# Patient Record
Sex: MALE | Race: WHITE | NOT HISPANIC OR LATINO
[De-identification: names, ages, dates, MRNs, and addresses within clinical notes are randomized per-mention and may not be internally consistent; named-entity substitution may affect disease eponyms.]

---

## 2017-02-20 ENCOUNTER — APPOINTMENT (OUTPATIENT)
Dept: FAMILY MEDICINE | Facility: CLINIC | Age: 52
End: 2017-02-20

## 2017-02-20 ENCOUNTER — NON-APPOINTMENT (OUTPATIENT)
Age: 52
End: 2017-02-20

## 2017-02-20 VITALS — SYSTOLIC BLOOD PRESSURE: 134 MMHG | DIASTOLIC BLOOD PRESSURE: 80 MMHG

## 2017-02-20 RX ORDER — KETOROLAC TROMETHAMINE 30 MG/ML
30 INJECTION, SOLUTION INTRAMUSCULAR; INTRAVENOUS
Qty: 1 | Refills: 0 | Status: COMPLETED | OUTPATIENT
Start: 2017-02-20

## 2017-02-20 RX ADMIN — KETOROLAC TROMETHAMINE 0 MG/ML: 30 INJECTION, SOLUTION INTRAMUSCULAR; INTRAVENOUS at 00:00

## 2017-10-12 ENCOUNTER — APPOINTMENT (OUTPATIENT)
Dept: FAMILY MEDICINE | Facility: CLINIC | Age: 52
End: 2017-10-12
Payer: MEDICAID

## 2017-10-12 VITALS
TEMPERATURE: 98.1 F | BODY MASS INDEX: 28.34 KG/M2 | HEART RATE: 85 BPM | DIASTOLIC BLOOD PRESSURE: 80 MMHG | OXYGEN SATURATION: 97 % | SYSTOLIC BLOOD PRESSURE: 130 MMHG | RESPIRATION RATE: 14 BRPM | HEIGHT: 68 IN | WEIGHT: 187 LBS

## 2017-10-12 PROCEDURE — 93000 ELECTROCARDIOGRAM COMPLETE: CPT

## 2017-10-12 PROCEDURE — 99396 PREV VISIT EST AGE 40-64: CPT | Mod: 25

## 2017-11-16 ENCOUNTER — APPOINTMENT (OUTPATIENT)
Dept: FAMILY MEDICINE | Facility: CLINIC | Age: 52
End: 2017-11-16
Payer: MEDICAID

## 2017-11-16 VITALS — SYSTOLIC BLOOD PRESSURE: 142 MMHG | DIASTOLIC BLOOD PRESSURE: 88 MMHG

## 2017-11-16 PROCEDURE — 99213 OFFICE O/P EST LOW 20 MIN: CPT

## 2017-12-12 ENCOUNTER — APPOINTMENT (OUTPATIENT)
Dept: FAMILY MEDICINE | Facility: CLINIC | Age: 52
End: 2017-12-12

## 2018-01-22 ENCOUNTER — APPOINTMENT (OUTPATIENT)
Dept: FAMILY MEDICINE | Facility: CLINIC | Age: 53
End: 2018-01-22

## 2018-02-06 ENCOUNTER — APPOINTMENT (OUTPATIENT)
Dept: FAMILY MEDICINE | Facility: CLINIC | Age: 53
End: 2018-02-06
Payer: MEDICAID

## 2018-02-06 VITALS — TEMPERATURE: 98.6 F | DIASTOLIC BLOOD PRESSURE: 84 MMHG | SYSTOLIC BLOOD PRESSURE: 120 MMHG

## 2018-02-06 PROCEDURE — 90688 IIV4 VACCINE SPLT 0.5 ML IM: CPT

## 2018-02-06 PROCEDURE — G0008: CPT

## 2018-02-06 PROCEDURE — 99213 OFFICE O/P EST LOW 20 MIN: CPT | Mod: 25

## 2018-02-06 RX ORDER — AZITHROMYCIN 250 MG/1
250 TABLET, FILM COATED ORAL
Qty: 1 | Refills: 0 | Status: COMPLETED | COMMUNITY
Start: 2017-10-12 | End: 2018-02-06

## 2018-07-09 ENCOUNTER — RX RENEWAL (OUTPATIENT)
Age: 53
End: 2018-07-09

## 2018-07-26 RX ORDER — OLMESARTAN MEDOXOMIL 40 MG/1
40 TABLET, FILM COATED ORAL
Qty: 1 | Refills: 2 | Status: COMPLETED | COMMUNITY
Start: 2018-07-24 | End: 2018-07-26

## 2018-08-14 ENCOUNTER — APPOINTMENT (OUTPATIENT)
Dept: FAMILY MEDICINE | Facility: CLINIC | Age: 53
End: 2018-08-14
Payer: MEDICAID

## 2018-08-14 VITALS — TEMPERATURE: 98.2 F | DIASTOLIC BLOOD PRESSURE: 92 MMHG | SYSTOLIC BLOOD PRESSURE: 138 MMHG

## 2018-08-14 PROCEDURE — 99214 OFFICE O/P EST MOD 30 MIN: CPT

## 2018-08-14 NOTE — HISTORY OF PRESENT ILLNESS
[de-identified] : Pt presents to office for followup on his blood pressure. Patient had to change valsartan due to recall  and is currently on lisinopril. Patient states that he is having a dry cough since he started medication. Patient also admits to having joint pain and body aches. 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 He has chronic lower back pain swelling and pain of his hands   and fingers.

## 2018-08-14 NOTE — REVIEW OF SYSTEMS
[Cough] : cough [Joint Pain] : joint pain [Joint Stiffness] : joint stiffness [Muscle Pain] : muscle pain [Back Pain] : back pain [Joint Swelling] : joint swelling [Negative] : Neurological

## 2018-08-14 NOTE — PHYSICAL EXAM
[No Acute Distress] : no acute distress [Supple] : supple [Clear to Auscultation] : lungs were clear to auscultation bilaterally [Regular Rhythm] : with a regular rhythm [No CVA Tenderness] : no CVA  tenderness [No Spinal Tenderness] : no spinal tenderness [Grossly Normal Strength/Tone] : grossly normal strength/tone

## 2018-09-04 ENCOUNTER — APPOINTMENT (OUTPATIENT)
Dept: FAMILY MEDICINE | Facility: CLINIC | Age: 53
End: 2018-09-04

## 2018-10-25 ENCOUNTER — RX RENEWAL (OUTPATIENT)
Age: 53
End: 2018-10-25

## 2018-12-20 ENCOUNTER — RX RENEWAL (OUTPATIENT)
Age: 53
End: 2018-12-20

## 2019-01-31 ENCOUNTER — RX RENEWAL (OUTPATIENT)
Age: 54
End: 2019-01-31

## 2019-04-18 ENCOUNTER — RX RENEWAL (OUTPATIENT)
Age: 54
End: 2019-04-18

## 2019-05-28 ENCOUNTER — RX RENEWAL (OUTPATIENT)
Age: 54
End: 2019-05-28

## 2019-07-12 ENCOUNTER — RX RENEWAL (OUTPATIENT)
Age: 54
End: 2019-07-12

## 2019-07-30 ENCOUNTER — NON-APPOINTMENT (OUTPATIENT)
Age: 54
End: 2019-07-30

## 2019-07-30 ENCOUNTER — APPOINTMENT (OUTPATIENT)
Dept: FAMILY MEDICINE | Facility: CLINIC | Age: 54
End: 2019-07-30
Payer: MEDICAID

## 2019-07-30 VITALS
HEIGHT: 68 IN | WEIGHT: 171 LBS | DIASTOLIC BLOOD PRESSURE: 80 MMHG | OXYGEN SATURATION: 98 % | BODY MASS INDEX: 25.91 KG/M2 | TEMPERATURE: 98.5 F | SYSTOLIC BLOOD PRESSURE: 136 MMHG | HEART RATE: 79 BPM | RESPIRATION RATE: 14 BRPM

## 2019-07-30 PROCEDURE — 99396 PREV VISIT EST AGE 40-64: CPT | Mod: 25

## 2019-07-30 PROCEDURE — 93000 ELECTROCARDIOGRAM COMPLETE: CPT

## 2019-07-30 NOTE — PHYSICAL EXAM
[Well Nourished] : well nourished [Well Developed] : well developed [No Acute Distress] : no acute distress [Well-Appearing] : well-appearing [Normal Sclera/Conjunctiva] : normal sclera/conjunctiva [PERRL] : pupils equal round and reactive to light [Normal Outer Ear/Nose] : the outer ears and nose were normal in appearance [EOMI] : extraocular movements intact [Normal Oropharynx] : the oropharynx was normal [No JVD] : no jugular venous distention [No Lymphadenopathy] : no lymphadenopathy [Supple] : supple [Thyroid Normal, No Nodules] : the thyroid was normal and there were no nodules present [No Respiratory Distress] : no respiratory distress  [No Accessory Muscle Use] : no accessory muscle use [Regular Rhythm] : with a regular rhythm [Clear to Auscultation] : lungs were clear to auscultation bilaterally [Normal Rate] : normal rate  [Normal S1, S2] : normal S1 and S2 [No Abdominal Bruit] : a ~M bruit was not heard ~T in the abdomen [No Murmur] : no murmur heard [No Carotid Bruits] : no carotid bruits [No Varicosities] : no varicosities [Pedal Pulses Present] : the pedal pulses are present [No Edema] : there was no peripheral edema [No Palpable Aorta] : no palpable aorta [Soft] : abdomen soft [No Extremity Clubbing/Cyanosis] : no extremity clubbing/cyanosis [Non-distended] : non-distended [No Masses] : no abdominal mass palpated [Non Tender] : non-tender [No HSM] : no HSM [Normal Bowel Sounds] : normal bowel sounds [Normal Sphincter Tone] : normal sphincter tone [Normal Posterior Cervical Nodes] : no posterior cervical lymphadenopathy [Stool Occult Blood] : stool negative for occult blood [No Mass] : no mass [Normal Anterior Cervical Nodes] : no anterior cervical lymphadenopathy [No CVA Tenderness] : no CVA  tenderness [No Spinal Tenderness] : no spinal tenderness [No Joint Swelling] : no joint swelling [Grossly Normal Strength/Tone] : grossly normal strength/tone [Coordination Grossly Intact] : coordination grossly intact [Normal Gait] : normal gait [No Focal Deficits] : no focal deficits [Normal Insight/Judgement] : insight and judgment were intact [Normal Affect] : the affect was normal [Deep Tendon Reflexes (DTR)] : deep tendon reflexes were 2+ and symmetric [de-identified] : small red raised lesion scaly on right nares

## 2019-07-30 NOTE — HISTORY OF PRESENT ILLNESS
[de-identified] : Patient is a 55 y/o male presents to office for routine physical exam. Patient states over the last several months he has been experiencing some shortness of breath mostly on exertion however he does admit to shortness of breath waking him up in the middle of the night. 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 Patient has not had any alcoholic drink in over 2 years. Patient also admits to  food Getting stuck. Mostly with solid foods however has had some episodes of liquid. Has never had a colonoscopy. Pt admits to taking only 1 BP med (however doesn’t recall the name of med)

## 2019-07-30 NOTE — REVIEW OF SYSTEMS
[Shortness Of Breath] : shortness of breath [Dyspnea on Exertion] : dyspnea on exertion [Negative] : Psychiatric

## 2019-07-30 NOTE — PLAN
[FreeTextEntry1] : Health Maintenance\par -referred for colonoscopy\par Dysphagia\par -referred for Endoscopy\par \par SOb\par -Referred to Cardiology\par Actinic Keratosis\par -referred to Derm\par \par

## 2019-08-15 ENCOUNTER — RX RENEWAL (OUTPATIENT)
Age: 54
End: 2019-08-15

## 2019-08-20 ENCOUNTER — NON-APPOINTMENT (OUTPATIENT)
Age: 54
End: 2019-08-20

## 2019-08-20 ENCOUNTER — APPOINTMENT (OUTPATIENT)
Dept: CARDIOLOGY | Facility: CLINIC | Age: 54
End: 2019-08-20
Payer: MEDICAID

## 2019-08-20 VITALS
HEART RATE: 69 BPM | DIASTOLIC BLOOD PRESSURE: 79 MMHG | BODY MASS INDEX: 26.22 KG/M2 | HEIGHT: 68 IN | OXYGEN SATURATION: 97 % | WEIGHT: 173 LBS | SYSTOLIC BLOOD PRESSURE: 132 MMHG

## 2019-08-20 DIAGNOSIS — Z83.3 FAMILY HISTORY OF DIABETES MELLITUS: ICD-10-CM

## 2019-08-20 DIAGNOSIS — Z82.49 FAMILY HISTORY OF ISCHEMIC HEART DISEASE AND OTHER DISEASES OF THE CIRCULATORY SYSTEM: ICD-10-CM

## 2019-08-20 DIAGNOSIS — Z87.898 PERSONAL HISTORY OF OTHER SPECIFIED CONDITIONS: ICD-10-CM

## 2019-08-20 PROCEDURE — 93000 ELECTROCARDIOGRAM COMPLETE: CPT

## 2019-08-20 PROCEDURE — 99204 OFFICE O/P NEW MOD 45 MIN: CPT

## 2019-08-20 RX ORDER — ALBUTEROL SULFATE 90 UG/1
108 (90 BASE) AEROSOL, METERED RESPIRATORY (INHALATION)
Qty: 1 | Refills: 5 | Status: DISCONTINUED | COMMUNITY
Start: 2017-10-12 | End: 2019-08-20

## 2019-08-20 RX ORDER — LOSARTAN POTASSIUM 100 MG/1
100 TABLET, FILM COATED ORAL
Qty: 30 | Refills: 5 | Status: DISCONTINUED | COMMUNITY
Start: 2018-08-14 | End: 2019-08-20

## 2019-08-20 NOTE — PHYSICAL EXAM
[General Appearance - Well Developed] : well developed [Normal Appearance] : normal appearance [Well Groomed] : well groomed [General Appearance - Well Nourished] : well nourished [No Deformities] : no deformities [General Appearance - In No Acute Distress] : no acute distress [Normal Conjunctiva] : the conjunctiva exhibited no abnormalities [Eyelids - No Xanthelasma] : the eyelids demonstrated no xanthelasmas [Normal Oral Mucosa] : normal oral mucosa [No Oral Cyanosis] : no oral cyanosis [No Oral Pallor] : no oral pallor [Normal Jugular Venous A Waves Present] : normal jugular venous A waves present [Normal Jugular Venous V Waves Present] : normal jugular venous V waves present [Heart Sounds] : normal S1 and S2 [No Jugular Venous Perez A Waves] : no jugular venous perez A waves [Heart Rate And Rhythm] : heart rate and rhythm were normal [Murmurs] : no murmurs present [Respiration, Rhythm And Depth] : normal respiratory rhythm and effort [Exaggerated Use Of Accessory Muscles For Inspiration] : no accessory muscle use [Auscultation Breath Sounds / Voice Sounds] : lungs were clear to auscultation bilaterally [Abdomen Soft] : soft [Abdomen Tenderness] : non-tender [Abdomen Mass (___ Cm)] : no abdominal mass palpated [Gait - Sufficient For Exercise Testing] : the gait was sufficient for exercise testing [Abnormal Walk] : normal gait [Cyanosis, Localized] : no localized cyanosis [Nail Clubbing] : no clubbing of the fingernails [Petechial Hemorrhages (___cm)] : no petechial hemorrhages [No Venous Stasis] : no venous stasis [] : no rash [Skin Color & Pigmentation] : normal skin color and pigmentation [Skin Lesions] : no skin lesions [No Skin Ulcers] : no skin ulcer [No Xanthoma] : no  xanthoma was observed [Oriented To Time, Place, And Person] : oriented to person, place, and time [Mood] : the mood was normal [Affect] : the affect was normal [No Anxiety] : not feeling anxious

## 2019-08-20 NOTE — HISTORY OF PRESENT ILLNESS
[FreeTextEntry1] : Raj is a 54-year-old gentleman former alcohol, social smoker, hypertension who presents with tachycardia on minimal exertion. He is physically active all day long and has been for years. Recently started noticing in the afternoon and evening gets short of breath and tachycardic with minimal activity. Denies any chest pain, dizziness or lightheadedness.

## 2019-08-20 NOTE — DISCUSSION/SUMMARY
[FreeTextEntry1] : The patient is a 54-year-old  gentleman former alcohol, social smoker, hypertension with a physically active job who is developing dyspnea and tachycardia but not consistent.\par #1 Htn- on lisinopril and amlodipine\par #2 CV- recommend echo, exercise stress test and cardiac CT

## 2019-08-20 NOTE — REVIEW OF SYSTEMS
[Shortness Of Breath] : shortness of breath [Chest Pain] : no chest pain [Lower Ext Edema] : no extremity edema [Dyspnea on exertion] : dyspnea during exertion [Palpitations] : palpitations [Negative] : Heme/Lymph

## 2019-08-28 ENCOUNTER — FORM ENCOUNTER (OUTPATIENT)
Age: 54
End: 2019-08-28

## 2019-08-29 ENCOUNTER — APPOINTMENT (OUTPATIENT)
Dept: CARDIOLOGY | Facility: CLINIC | Age: 54
End: 2019-08-29

## 2019-08-29 ENCOUNTER — OUTPATIENT (OUTPATIENT)
Dept: OUTPATIENT SERVICES | Facility: HOSPITAL | Age: 54
LOS: 1 days | End: 2019-08-29
Payer: MEDICAID

## 2019-08-29 DIAGNOSIS — R07.89 OTHER CHEST PAIN: ICD-10-CM

## 2019-08-29 DIAGNOSIS — R06.02 SHORTNESS OF BREATH: ICD-10-CM

## 2019-08-29 DIAGNOSIS — I10 ESSENTIAL (PRIMARY) HYPERTENSION: ICD-10-CM

## 2019-08-29 PROCEDURE — 75574 CT ANGIO HRT W/3D IMAGE: CPT | Mod: 26

## 2019-08-29 PROCEDURE — 75574 CT ANGIO HRT W/3D IMAGE: CPT

## 2019-10-01 ENCOUNTER — APPOINTMENT (OUTPATIENT)
Dept: GASTROENTEROLOGY | Facility: CLINIC | Age: 54
End: 2019-10-01
Payer: MEDICAID

## 2019-10-01 VITALS
WEIGHT: 171 LBS | DIASTOLIC BLOOD PRESSURE: 88 MMHG | SYSTOLIC BLOOD PRESSURE: 126 MMHG | HEART RATE: 76 BPM | HEIGHT: 68 IN | BODY MASS INDEX: 25.91 KG/M2 | OXYGEN SATURATION: 97 %

## 2019-10-01 DIAGNOSIS — Z80.9 FAMILY HISTORY OF MALIGNANT NEOPLASM, UNSPECIFIED: ICD-10-CM

## 2019-10-01 DIAGNOSIS — Z80.0 FAMILY HISTORY OF MALIGNANT NEOPLASM OF DIGESTIVE ORGANS: ICD-10-CM

## 2019-10-01 PROCEDURE — 99203 OFFICE O/P NEW LOW 30 MIN: CPT

## 2019-10-01 NOTE — PHYSICAL EXAM
[General Appearance - Alert] : alert [General Appearance - In No Acute Distress] : in no acute distress [Sclera] : the sclera and conjunctiva were normal [Neck Appearance] : the appearance of the neck was normal [Exaggerated Use Of Accessory Muscles For Inspiration] : no accessory muscle use [Heart Rate And Rhythm] : heart rate was normal and rhythm regular [Auscultation Breath Sounds / Voice Sounds] : lungs were clear to auscultation bilaterally [Murmurs] : no murmurs [Heart Sounds] : normal S1 and S2 [Edema] : there was no peripheral edema [Bowel Sounds] : normal bowel sounds [Abdomen Tenderness] : non-tender [Abdomen Soft] : soft [Abdomen Mass (___ Cm)] : no abdominal mass palpated [Skin Color & Pigmentation] : normal skin color and pigmentation [No Spinal Tenderness] : no spinal tenderness [Involuntary Movements] : no involuntary movements were seen [] : no rash [Oriented To Time, Place, And Person] : oriented to person, place, and time [No Focal Deficits] : no focal deficits [Mood] : the mood was normal [Affect] : the affect was normal

## 2019-10-01 NOTE — ASSESSMENT
[FreeTextEntry1] : Impression:\par 1. Dysphagia to both solids and some liquids - differential includes rings/strictures, esophageal neoplasm, motility disorder, eosinophilic esophagitis, reflux esophagitis\par 2. Colon cancer screening - patient is overdue as he has never had any screening\par 3. Hematochezia - intermittent - differential includes hemorrhoids, fissures, colon cancer, angioectasia, diverticulosis\par \par Plan:\par -Given dysphagia and some weight loss, will plan for EGD for evaluation\par -Will perform colonoscopy at he same time for screening purposes\par -RIsks and benefits of endoscopic evaluation, including risks of bleeding, infection, missed lesions, perforation explained. Patient is agreeable to the procedures\par -Discussed that if no findings are found on EGD, he might need esophageal manometry\par -Patient declined empiric trial of PPI at this time, would like to have endoscopic evaluation first\par -Discussed lifestyle modifications for GERD\par -Avoid NSAIDs\par \par -Discussed with Dr. Moreau from cardiology today - no objection from cardiac standpoint for EGD/colonoscopy.\par \par RTC after EGD/colonoscopy

## 2019-10-01 NOTE — HISTORY OF PRESENT ILLNESS
[Heartburn] : stable heartburn [Nausea] : denies nausea [Diarrhea] : denies diarrhea [Vomiting] : denies vomiting [Constipation] : denies constipation [Yellow Skin Or Eyes (Jaundice)] : denies jaundice [Abdominal Swelling] : denies abdominal swelling [Abdominal Pain] : denies abdominal pain [Rectal Pain] : denies rectal pain [Wt Loss ___ Lbs] : recent [unfilled] ~Upound(s) weight loss [Pancreatitis] : pancreatitis [de-identified] : This is a 54 year old male with previous ETOH dependence, HTN, who presents for evaluation of dysphagia and colon cancer screening. \par \par The patient has been having dysphagia for the last 6-8 months. He reports dysphagia that is worse to solids such as bread and large pieces of meat, but at times will also feel that liquids is getting stuck. It gets stuck at the level of his neck. He denies nausea/vomiting and denies regurgitation. He does have some reflux/dyspepsia, but it's relatively minor and does not take any medications for it. He denies any abdominal pain. His bowel movements are formed, normal daily. He denies steatorrhea. Once in a while he will see some blood while wiping, but his stool is otherwise brown. He took some Meloxicam a few months ago, but currently is not on any NSAIDs. Overall, he feels the dysphagia is getting worse. He had an EGD 6 years ago and was told he had some evidence of reflux (report not available for review) and has never had a colonoscopy before. He reports a weight loss of about 4 lb over the last few months.\par \par He had a history of recurrent pancreatitis every few months, but since he stopped drinking alcohol he has not had any episodes.\par \par \par 6/10/19 \par T protein 7, Albumin 4.8, Glucose 107, Na 143, K 5, Cl 103, Bicarb 29, BUN 17, Cr 0.8, Ca 9.4, T bili 1.5, ALP 64, AST 14, ALT 14, GGT 19 \par \par WBC 7.3, Hgb 15, Hct 44.9,  \par \par CT coronary 8/29/19: \par IMPRESSION: \par CT coronary angiography shows: \par LMCA: Patent. \par LAD: Mixed calcified and noncalcified plaque within the proximal to mid \par segments resulting in mild (less than 50%) luminal narrowing. Mixed \par calcified and noncalcified plaque within the mid segment resulting in \par moderate (50-60%) luminal narrowing. \par LCx: Patent. \par RCA: Dominant vessel, predominantly noncalcified plaque within the \par proximal segment resulting in moderate (50-70%) luminal narrowing. Mixed \par calcified and noncalcified plaque within the proximal to mid segment \par resulting in mild (less than 50%) luminal narrowing.

## 2019-11-29 ENCOUNTER — APPOINTMENT (OUTPATIENT)
Dept: GASTROENTEROLOGY | Facility: HOSPITAL | Age: 54
End: 2019-11-29

## 2020-01-10 ENCOUNTER — APPOINTMENT (OUTPATIENT)
Dept: GASTROENTEROLOGY | Facility: HOSPITAL | Age: 55
End: 2020-01-10

## 2020-01-10 ENCOUNTER — RESULT REVIEW (OUTPATIENT)
Age: 55
End: 2020-01-10

## 2020-01-10 ENCOUNTER — OUTPATIENT (OUTPATIENT)
Dept: OUTPATIENT SERVICES | Facility: HOSPITAL | Age: 55
LOS: 1 days | End: 2020-01-10
Payer: MEDICAID

## 2020-01-10 DIAGNOSIS — Z80.9 FAMILY HISTORY OF MALIGNANT NEOPLASM, UNSPECIFIED: ICD-10-CM

## 2020-01-10 PROCEDURE — 88305 TISSUE EXAM BY PATHOLOGIST: CPT

## 2020-01-10 PROCEDURE — 43239 EGD BIOPSY SINGLE/MULTIPLE: CPT

## 2020-01-10 PROCEDURE — 88312 SPECIAL STAINS GROUP 1: CPT | Mod: 26

## 2020-01-10 PROCEDURE — 88305 TISSUE EXAM BY PATHOLOGIST: CPT | Mod: 26

## 2020-01-10 PROCEDURE — 88312 SPECIAL STAINS GROUP 1: CPT

## 2020-01-10 PROCEDURE — G0121: CPT

## 2020-01-10 PROCEDURE — 45378 DIAGNOSTIC COLONOSCOPY: CPT

## 2020-01-10 RX ORDER — OMEPRAZOLE 40 MG/1
40 CAPSULE, DELAYED RELEASE ORAL
Qty: 30 | Refills: 2 | Status: ACTIVE | COMMUNITY
Start: 2020-01-10 | End: 1900-01-01

## 2020-01-15 RX ORDER — POLYETHYLENE GLYCOL 3350 AND ELECTROLYTES WITH LEMON FLAVOR 236; 22.74; 6.74; 5.86; 2.97 G/4L; G/4L; G/4L; G/4L; G/4L
236 POWDER, FOR SOLUTION ORAL
Qty: 1 | Refills: 0 | Status: COMPLETED | COMMUNITY
Start: 2019-10-01 | End: 2020-01-15

## 2020-03-01 ENCOUNTER — RX RENEWAL (OUTPATIENT)
Age: 55
End: 2020-03-01

## 2020-05-20 LAB
SARS-COV-2 IGG SERPL IA-ACNC: 0.1 INDEX
SARS-COV-2 IGG SERPL QL IA: NEGATIVE

## 2020-10-26 ENCOUNTER — APPOINTMENT (OUTPATIENT)
Dept: FAMILY MEDICINE | Facility: CLINIC | Age: 55
End: 2020-10-26
Payer: MEDICAID

## 2020-10-26 VITALS
DIASTOLIC BLOOD PRESSURE: 82 MMHG | WEIGHT: 166 LBS | HEIGHT: 68 IN | TEMPERATURE: 98.4 F | SYSTOLIC BLOOD PRESSURE: 148 MMHG | HEART RATE: 70 BPM | OXYGEN SATURATION: 97 % | BODY MASS INDEX: 25.16 KG/M2 | RESPIRATION RATE: 14 BRPM

## 2020-10-26 PROCEDURE — 99396 PREV VISIT EST AGE 40-64: CPT | Mod: 25

## 2020-10-26 PROCEDURE — G0008: CPT

## 2020-10-26 PROCEDURE — 99072 ADDL SUPL MATRL&STAF TM PHE: CPT

## 2020-10-26 PROCEDURE — 90686 IIV4 VACC NO PRSV 0.5 ML IM: CPT

## 2020-10-26 NOTE — HISTORY OF PRESENT ILLNESS
[de-identified] : 56 y/o M PMHx HTN (Lisinopril), HLD (Atorvastatin) presents to office for routine PE>pT states he was in ER 1 month ago for pounding chest. Also had accompanied vomiting for 1 episdode. Pt also ran out of BP meds 3 days ago. Monitors BP at home ranging -15-Pt had angiogram which according to patient was Normal. Has had this issue for many years. Denies SOB, nausea or vomiting.   Labs reviewed  Glucose 141

## 2020-10-26 NOTE — PHYSICAL EXAM
[No Acute Distress] : no acute distress [Well Nourished] : well nourished [Well Developed] : well developed [Well-Appearing] : well-appearing [Normal Sclera/Conjunctiva] : normal sclera/conjunctiva [PERRL] : pupils equal round and reactive to light [EOMI] : extraocular movements intact [Normal Outer Ear/Nose] : the outer ears and nose were normal in appearance [Normal Oropharynx] : the oropharynx was normal [No JVD] : no jugular venous distention [No Lymphadenopathy] : no lymphadenopathy [Supple] : supple [Thyroid Normal, No Nodules] : the thyroid was normal and there were no nodules present [No Respiratory Distress] : no respiratory distress  [No Accessory Muscle Use] : no accessory muscle use [Clear to Auscultation] : lungs were clear to auscultation bilaterally [Normal Rate] : normal rate  [Regular Rhythm] : with a regular rhythm [Normal S1, S2] : normal S1 and S2 [No Murmur] : no murmur heard [No Carotid Bruits] : no carotid bruits [No Abdominal Bruit] : a ~M bruit was not heard ~T in the abdomen [No Varicosities] : no varicosities [Pedal Pulses Present] : the pedal pulses are present [No Edema] : there was no peripheral edema [No Palpable Aorta] : no palpable aorta [No Extremity Clubbing/Cyanosis] : no extremity clubbing/cyanosis [Soft] : abdomen soft [Non Tender] : non-tender [Non-distended] : non-distended [No Masses] : no abdominal mass palpated [No HSM] : no HSM [Normal Bowel Sounds] : normal bowel sounds [Normal Posterior Cervical Nodes] : no posterior cervical lymphadenopathy [Normal Anterior Cervical Nodes] : no anterior cervical lymphadenopathy [No CVA Tenderness] : no CVA  tenderness [No Spinal Tenderness] : no spinal tenderness [No Joint Swelling] : no joint swelling [Grossly Normal Strength/Tone] : grossly normal strength/tone [No Rash] : no rash [Coordination Grossly Intact] : coordination grossly intact [No Focal Deficits] : no focal deficits [Normal Gait] : normal gait [Normal Affect] : the affect was normal [Normal Insight/Judgement] : insight and judgment were intact [FreeTextEntry1] : prostate smooth non enlarged, Guaiac NEG

## 2020-11-11 ENCOUNTER — RX RENEWAL (OUTPATIENT)
Age: 55
End: 2020-11-11

## 2021-03-24 ENCOUNTER — APPOINTMENT (OUTPATIENT)
Dept: OTOLARYNGOLOGY | Facility: CLINIC | Age: 56
End: 2021-03-24
Payer: MEDICAID

## 2021-03-24 VITALS
WEIGHT: 164 LBS | TEMPERATURE: 97.9 F | BODY MASS INDEX: 24.29 KG/M2 | HEART RATE: 72 BPM | HEIGHT: 69 IN | DIASTOLIC BLOOD PRESSURE: 82 MMHG | SYSTOLIC BLOOD PRESSURE: 153 MMHG

## 2021-03-24 DIAGNOSIS — K21.9 GASTRO-ESOPHAGEAL REFLUX DISEASE W/OUT ESOPHAGITIS: ICD-10-CM

## 2021-03-24 PROCEDURE — 92567 TYMPANOMETRY: CPT

## 2021-03-24 PROCEDURE — 92557 COMPREHENSIVE HEARING TEST: CPT

## 2021-03-24 PROCEDURE — 99204 OFFICE O/P NEW MOD 45 MIN: CPT | Mod: 25

## 2021-03-24 PROCEDURE — 31575 DIAGNOSTIC LARYNGOSCOPY: CPT

## 2021-03-24 PROCEDURE — 99072 ADDL SUPL MATRL&STAF TM PHE: CPT

## 2021-03-24 NOTE — PROCEDURE
[de-identified] : Flexible scope #27 used. Passed through nasal passage and nasopharynx/oropharynx/hypopharynx clear. Supraglottis normal. Glottis with fully mobile vocal cords without lesions or masses. Visualized subglottis clear. Postcricoid area with erythema or edema. No pooling of secretions.\par \par

## 2021-03-24 NOTE — HISTORY OF PRESENT ILLNESS
[de-identified] : 55 y/o M with 1 year of dysphagia, feels food is getting stuck in the chest, occasionally needs to spit out food. Sometimes is painful until it finally goes down with water.  Voice is good.  \par Does have nasal congestion and occasional bleeding.  No recent bleeding.  Started Flonase recently but uses inconsistently. Having sinus pressure and congestion.\par Does note some unintentional weight loss recently. No hemoptysis. \par Also hearing loss, notes used HA in the past but has not used them in a long time. Was told he had a sudden hearing loss many years ago. Reports he did not have any imaging at that time.

## 2021-03-24 NOTE — ASSESSMENT
[FreeTextEntry1] : Dysphagia:\par - Will get MBS and Esophagram - will call with results\par - needs EGD as well, given number to call and schedule GI f/U for EGD\par \par LPRD:\par - Reflux precautions - handout given\par - Will hold off on medication until he sees GI\par \par nasal congestion:\par - start flonase BID consistently\par \par Asymmetric SNHL:\par - Audiogram today with right asymmetric SNHL, both need HA\par - rec MRI IAC to r/o retrocochlear process\par - recommend HAE and given clearance form and copy of audiogram\par \par HTN\par - F/U with PMD\par

## 2021-03-24 NOTE — DATA REVIEWED
[de-identified] : 3/24/21: asymmetric SNHL, worse on right in mid-frequencies but both go to HF mod-severe SNHL, good discrim

## 2021-03-24 NOTE — CONSULT LETTER
[Dear  ___] : Dear  [unfilled], [Consult Letter:] : I had the pleasure of evaluating your patient, [unfilled]. [Please see my note below.] : Please see my note below. [Consult Closing:] : Thank you very much for allowing me to participate in the care of this patient.  If you have any questions, please do not hesitate to contact me. [Sincerely,] : Sincerely, [FreeTextEntry3] : Serena Wall MD\par Otolaryngology and Cranial Base Surgery\par Attending Physician - Department of Otolaryngology and Head & Neck Surgery\par [facility]\par  - [employer] [facility]\par Office: [phone]\par Fax: [phone]\par

## 2021-03-29 ENCOUNTER — TRANSCRIPTION ENCOUNTER (OUTPATIENT)
Age: 56
End: 2021-03-29

## 2021-03-30 ENCOUNTER — RX RENEWAL (OUTPATIENT)
Age: 56
End: 2021-03-30

## 2021-04-12 ENCOUNTER — OUTPATIENT (OUTPATIENT)
Dept: OUTPATIENT SERVICES | Facility: HOSPITAL | Age: 56
LOS: 1 days | End: 2021-04-12
Payer: MEDICAID

## 2021-04-12 ENCOUNTER — APPOINTMENT (OUTPATIENT)
Dept: MRI IMAGING | Facility: CLINIC | Age: 56
End: 2021-04-12
Payer: MEDICAID

## 2021-04-12 DIAGNOSIS — H90.5 UNSPECIFIED SENSORINEURAL HEARING LOSS: ICD-10-CM

## 2021-04-12 LAB
COVID-19 NUCLEOCAPSID  GAM ANTIBODY INTERPRETATION: NEGATIVE
SARS-COV-2 AB SERPL QL IA: 0.08 INDEX

## 2021-04-12 PROCEDURE — 70551 MRI BRAIN STEM W/O DYE: CPT | Mod: 26

## 2021-04-12 PROCEDURE — 70551 MRI BRAIN STEM W/O DYE: CPT

## 2021-04-13 ENCOUNTER — NON-APPOINTMENT (OUTPATIENT)
Age: 56
End: 2021-04-13

## 2021-04-27 ENCOUNTER — NON-APPOINTMENT (OUTPATIENT)
Age: 56
End: 2021-04-27

## 2021-04-27 ENCOUNTER — OUTPATIENT (OUTPATIENT)
Dept: OUTPATIENT SERVICES | Facility: HOSPITAL | Age: 56
LOS: 1 days | End: 2021-04-27

## 2021-04-27 ENCOUNTER — APPOINTMENT (OUTPATIENT)
Dept: RADIOLOGY | Facility: HOSPITAL | Age: 56
End: 2021-04-27
Payer: MEDICAID

## 2021-04-27 ENCOUNTER — OUTPATIENT (OUTPATIENT)
Dept: OUTPATIENT SERVICES | Facility: HOSPITAL | Age: 56
LOS: 1 days | Discharge: HOME | End: 2021-04-27

## 2021-04-27 ENCOUNTER — APPOINTMENT (OUTPATIENT)
Dept: SPEECH THERAPY | Facility: HOSPITAL | Age: 56
End: 2021-04-27
Payer: MEDICAID

## 2021-04-27 DIAGNOSIS — R13.10 DYSPHAGIA, UNSPECIFIED: ICD-10-CM

## 2021-04-27 PROCEDURE — 74220 X-RAY XM ESOPHAGUS 1CNTRST: CPT | Mod: 26,59

## 2021-04-27 PROCEDURE — 74230 X-RAY XM SWLNG FUNCJ C+: CPT | Mod: 26

## 2021-04-27 PROCEDURE — 74230 X-RAY XM SWLNG FUNCJ C+: CPT | Mod: 26,59

## 2021-04-27 PROCEDURE — 74220 X-RAY XM ESOPHAGUS 1CNTRST: CPT | Mod: 26

## 2021-05-05 DIAGNOSIS — R13.10 DYSPHAGIA, UNSPECIFIED: ICD-10-CM

## 2021-06-03 ENCOUNTER — RX RENEWAL (OUTPATIENT)
Age: 56
End: 2021-06-03

## 2021-08-26 ENCOUNTER — RX RENEWAL (OUTPATIENT)
Age: 56
End: 2021-08-26

## 2021-09-08 NOTE — ASSESSMENT
[FreeTextEntry1] : Pt presents to office for followup on his blood pressure. Patient had to change valsartan due to recall  and is currently on lisinopril. Patient states that he is having a dry cough since he started medication. Patient also admits to having joint pain and body aches. 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 He has chronic lower back pain swelling and pain of his hands   and fingers.\par \par Pt referred to Rheum\par Pt to stop Lisinopril and start Losartan\par Start Meloxicam  Detail Level: Zone Detail Level: Simple Detail Level: Generalized Detail Level: Detailed Sunscreen Recommendations: Continue with excellent zinc based sunscreen

## 2021-10-24 ENCOUNTER — NON-APPOINTMENT (OUTPATIENT)
Age: 56
End: 2021-10-24

## 2021-10-28 ENCOUNTER — NON-APPOINTMENT (OUTPATIENT)
Age: 56
End: 2021-10-28

## 2021-10-28 ENCOUNTER — APPOINTMENT (OUTPATIENT)
Dept: FAMILY MEDICINE | Facility: CLINIC | Age: 56
End: 2021-10-28
Payer: MEDICAID

## 2021-10-28 VITALS
WEIGHT: 167 LBS | DIASTOLIC BLOOD PRESSURE: 82 MMHG | BODY MASS INDEX: 24.73 KG/M2 | SYSTOLIC BLOOD PRESSURE: 136 MMHG | TEMPERATURE: 98.6 F | RESPIRATION RATE: 14 BRPM | HEART RATE: 82 BPM | OXYGEN SATURATION: 98 % | HEIGHT: 69 IN

## 2021-10-28 PROCEDURE — G0008: CPT

## 2021-10-28 PROCEDURE — 99396 PREV VISIT EST AGE 40-64: CPT | Mod: 25

## 2021-10-28 PROCEDURE — 93000 ELECTROCARDIOGRAM COMPLETE: CPT

## 2021-10-28 PROCEDURE — 90686 IIV4 VACC NO PRSV 0.5 ML IM: CPT

## 2021-10-29 NOTE — HISTORY OF PRESENT ILLNESS
[de-identified] : 57 y/o M PMHx HTN (Lisinopril), HLD stopped 1 year ago Lipitor ) presents to office for routine PE. Pt c/o pains in joints. Works in construction stone, [employer]. Some sinus  congestion swelling around lower eyes.  POssible allergies. Labs reviwed WNL

## 2021-10-29 NOTE — PHYSICAL EXAM
[No Acute Distress] : no acute distress [Well Nourished] : well nourished [Well Developed] : well developed [Well-Appearing] : well-appearing [Normal Sclera/Conjunctiva] : normal sclera/conjunctiva [PERRL] : pupils equal round and reactive to light [EOMI] : extraocular movements intact [Normal Outer Ear/Nose] : the outer ears and nose were normal in appearance [Normal Oropharynx] : the oropharynx was normal [No JVD] : no jugular venous distention [No Lymphadenopathy] : no lymphadenopathy [Supple] : supple [Thyroid Normal, No Nodules] : the thyroid was normal and there were no nodules present [No Respiratory Distress] : no respiratory distress  [No Accessory Muscle Use] : no accessory muscle use [Clear to Auscultation] : lungs were clear to auscultation bilaterally [Normal Rate] : normal rate  [Regular Rhythm] : with a regular rhythm [Normal S1, S2] : normal S1 and S2 [No Murmur] : no murmur heard [No Carotid Bruits] : no carotid bruits [No Abdominal Bruit] : a ~M bruit was not heard ~T in the abdomen [No Varicosities] : no varicosities [Pedal Pulses Present] : the pedal pulses are present [No Edema] : there was no peripheral edema [No Palpable Aorta] : no palpable aorta [No Extremity Clubbing/Cyanosis] : no extremity clubbing/cyanosis [Soft] : abdomen soft [Non Tender] : non-tender [Non-distended] : non-distended [No Masses] : no abdominal mass palpated [No HSM] : no HSM [Normal Bowel Sounds] : normal bowel sounds [Normal Posterior Cervical Nodes] : no posterior cervical lymphadenopathy [Normal Anterior Cervical Nodes] : no anterior cervical lymphadenopathy [No CVA Tenderness] : no CVA  tenderness [No Spinal Tenderness] : no spinal tenderness [Grossly Normal Strength/Tone] : grossly normal strength/tone [No Rash] : no rash [Coordination Grossly Intact] : coordination grossly intact [No Focal Deficits] : no focal deficits [Normal Gait] : normal gait [Deep Tendon Reflexes (DTR)] : deep tendon reflexes were 2+ and symmetric [Normal Affect] : the affect was normal [Normal Insight/Judgement] : insight and judgment were intact [de-identified] : joint swelling PIP DIP B/L

## 2022-01-19 ENCOUNTER — RX RENEWAL (OUTPATIENT)
Age: 57
End: 2022-01-19

## 2022-02-18 ENCOUNTER — APPOINTMENT (OUTPATIENT)
Dept: FAMILY MEDICINE | Facility: CLINIC | Age: 57
End: 2022-02-18

## 2022-02-22 ENCOUNTER — NON-APPOINTMENT (OUTPATIENT)
Age: 57
End: 2022-02-22

## 2022-02-22 ENCOUNTER — APPOINTMENT (OUTPATIENT)
Dept: FAMILY MEDICINE | Facility: CLINIC | Age: 57
End: 2022-02-22
Payer: MEDICAID

## 2022-02-22 VITALS
RESPIRATION RATE: 14 BRPM | BODY MASS INDEX: 24.71 KG/M2 | HEART RATE: 75 BPM | HEIGHT: 69 IN | WEIGHT: 166.8 LBS | OXYGEN SATURATION: 97 % | TEMPERATURE: 98 F | DIASTOLIC BLOOD PRESSURE: 76 MMHG | SYSTOLIC BLOOD PRESSURE: 134 MMHG

## 2022-02-22 DIAGNOSIS — F17.200 NICOTINE DEPENDENCE, UNSPECIFIED, UNCOMPLICATED: ICD-10-CM

## 2022-02-22 DIAGNOSIS — Z01.818 ENCOUNTER FOR OTHER PREPROCEDURAL EXAMINATION: ICD-10-CM

## 2022-02-22 DIAGNOSIS — M25.50 PAIN IN UNSPECIFIED JOINT: ICD-10-CM

## 2022-02-22 DIAGNOSIS — Z72.89 OTHER PROBLEMS RELATED TO LIFESTYLE: ICD-10-CM

## 2022-02-22 DIAGNOSIS — K22.10 ULCER OF ESOPHAGUS W/OUT BLEEDING: ICD-10-CM

## 2022-02-22 DIAGNOSIS — M54.50 LOW BACK PAIN, UNSPECIFIED: ICD-10-CM

## 2022-02-22 DIAGNOSIS — Z12.11 ENCOUNTER FOR SCREENING FOR MALIGNANT NEOPLASM OF COLON: ICD-10-CM

## 2022-02-22 DIAGNOSIS — H11.823 CONJUNCTIVOCHALASIS, BILATERAL: ICD-10-CM

## 2022-02-22 DIAGNOSIS — Z86.59 PERSONAL HISTORY OF OTHER MENTAL AND BEHAVIORAL DISORDERS: ICD-10-CM

## 2022-02-22 DIAGNOSIS — R20.2 ANESTHESIA OF SKIN: ICD-10-CM

## 2022-02-22 DIAGNOSIS — Z87.19 PERSONAL HISTORY OF OTHER DISEASES OF THE DIGESTIVE SYSTEM: ICD-10-CM

## 2022-02-22 DIAGNOSIS — R20.0 ANESTHESIA OF SKIN: ICD-10-CM

## 2022-02-22 DIAGNOSIS — Z87.39 PERSONAL HISTORY OF OTHER DISEASES OF THE MUSCULOSKELETAL SYSTEM AND CONNECTIVE TISSUE: ICD-10-CM

## 2022-02-22 DIAGNOSIS — Z92.89 PERSONAL HISTORY OF OTHER MEDICAL TREATMENT: ICD-10-CM

## 2022-02-22 DIAGNOSIS — G89.29 LOW BACK PAIN, UNSPECIFIED: ICD-10-CM

## 2022-02-22 DIAGNOSIS — Z86.69 PERSONAL HISTORY OF OTHER DISEASES OF THE NERVOUS SYSTEM AND SENSE ORGANS: ICD-10-CM

## 2022-02-22 DIAGNOSIS — Z87.898 PERSONAL HISTORY OF OTHER SPECIFIED CONDITIONS: ICD-10-CM

## 2022-02-22 DIAGNOSIS — I25.10 ATHEROSCLEROTIC HEART DISEASE OF NATIVE CORONARY ARTERY W/OUT ANGINA PECTORIS: ICD-10-CM

## 2022-02-22 DIAGNOSIS — F10.10 ALCOHOL ABUSE, UNCOMPLICATED: ICD-10-CM

## 2022-02-22 DIAGNOSIS — Z20.822 CONTACT WITH AND (SUSPECTED) EXPOSURE TO COVID-19: ICD-10-CM

## 2022-02-22 DIAGNOSIS — Z86.39 PERSONAL HISTORY OF OTHER ENDOCRINE, NUTRITIONAL AND METABOLIC DISEASE: ICD-10-CM

## 2022-02-22 DIAGNOSIS — R07.89 OTHER CHEST PAIN: ICD-10-CM

## 2022-02-22 DIAGNOSIS — H90.3 SENSORINEURAL HEARING LOSS, BILATERAL: ICD-10-CM

## 2022-02-22 PROCEDURE — 99214 OFFICE O/P EST MOD 30 MIN: CPT | Mod: 25

## 2022-02-22 PROCEDURE — 93000 ELECTROCARDIOGRAM COMPLETE: CPT

## 2022-02-22 RX ORDER — ATORVASTATIN CALCIUM 20 MG/1
20 TABLET, FILM COATED ORAL
Qty: 90 | Refills: 1 | Status: DISCONTINUED | COMMUNITY
Start: 2019-08-30 | End: 2022-02-22

## 2022-02-23 PROBLEM — Z01.818 PRE-OP EVALUATION: Status: ACTIVE | Noted: 2022-02-22

## 2022-02-23 PROBLEM — H11.823 CONJUNCTIVOCHALASIS OF BOTH EYES: Status: ACTIVE | Noted: 2022-02-23

## 2022-02-23 NOTE — HISTORY OF PRESENT ILLNESS
[No Pertinent Cardiac History] : no history of aortic stenosis, atrial fibrillation, coronary artery disease, recent myocardial infarction, or implantable device/pacemaker [No Pertinent Pulmonary History] : no history of asthma, COPD, sleep apnea, or smoking [No Adverse Anesthesia Reaction] : no adverse anesthesia reaction in self or family member [(Patient denies any chest pain, claudication, dyspnea on exertion, orthopnea, palpitations or syncope)] : Patient denies any chest pain, claudication, dyspnea on exertion, orthopnea, palpitations or syncope [____ METs%] : [unfilled] METs% [Excellent (>10 METs)] : Excellent (>10 METs) [NSAIDs: _____] : NSAIDs: [unfilled] [Herbal Supplements: _____] : Herbal Supplements: [unfilled] [Aortic Stenosis] : no aortic stenosis [Atrial Fibrillation] : no atrial fibrillation [Coronary Artery Disease] : no coronary artery disease [Recent Myocardial Infarction] : no recent myocardial infarction [Implantable Device/Pacemaker] : no implantable device/pacemaker [Chronic Anticoagulation] : no chronic anticoagulation [Chronic Kidney Disease] : no chronic kidney disease [Diabetes] : no diabetes [FreeTextEntry1] : ocular surface reconstruction with amniotic membrane bio tissue graft 2.0 x 1.5 cm and reconstruction of cul de sac of right eye on 2/25/22 and left eye on 3/11/22 [FreeTextEntry2] : 2/25/2022 and 3/11/2022 [FreeTextEntry3] : Dr. Gerardo Maria MD  [FreeTextEntry4] : overall is feeling well\par history of conjunctivochalasis \par denies any other complaints or concerns at this time

## 2022-03-15 ENCOUNTER — APPOINTMENT (OUTPATIENT)
Dept: RHEUMATOLOGY | Facility: CLINIC | Age: 57
End: 2022-03-15
Payer: MEDICAID

## 2022-03-15 ENCOUNTER — RESULT REVIEW (OUTPATIENT)
Age: 57
End: 2022-03-15

## 2022-03-15 VITALS
DIASTOLIC BLOOD PRESSURE: 111 MMHG | SYSTOLIC BLOOD PRESSURE: 155 MMHG | BODY MASS INDEX: 24.14 KG/M2 | OXYGEN SATURATION: 96 % | WEIGHT: 163 LBS | HEIGHT: 69 IN | TEMPERATURE: 97.2 F | HEART RATE: 83 BPM

## 2022-03-15 DIAGNOSIS — M19.079 PRIMARY OSTEOARTHRITIS, UNSPECIFIED ANKLE AND FOOT: ICD-10-CM

## 2022-03-15 PROCEDURE — 99205 OFFICE O/P NEW HI 60 MIN: CPT

## 2022-03-18 ENCOUNTER — APPOINTMENT (OUTPATIENT)
Dept: RADIOLOGY | Facility: CLINIC | Age: 57
End: 2022-03-18

## 2022-03-18 ENCOUNTER — APPOINTMENT (OUTPATIENT)
Dept: RADIOLOGY | Facility: CLINIC | Age: 57
End: 2022-03-18
Payer: MEDICAID

## 2022-03-18 PROCEDURE — 73130 X-RAY EXAM OF HAND: CPT | Mod: 26,50

## 2022-03-18 PROCEDURE — 73610 X-RAY EXAM OF ANKLE: CPT | Mod: 26,50

## 2022-03-18 PROCEDURE — 72040 X-RAY EXAM NECK SPINE 2-3 VW: CPT | Mod: 26

## 2022-03-18 PROCEDURE — 72100 X-RAY EXAM L-S SPINE 2/3 VWS: CPT | Mod: 26

## 2022-03-18 PROCEDURE — 73630 X-RAY EXAM OF FOOT: CPT | Mod: 26,50

## 2022-03-24 LAB
ALBUMIN SERPL ELPH-MCNC: 5.4 G/DL
ALP BLD-CCNC: 84 U/L
ALT SERPL-CCNC: 12 U/L
ANION GAP SERPL CALC-SCNC: 15 MMOL/L
AST SERPL-CCNC: 18 U/L
BASOPHILS # BLD AUTO: 0.06 K/UL
BASOPHILS NFR BLD AUTO: 0.5 %
BILIRUB SERPL-MCNC: 2.2 MG/DL
BUN SERPL-MCNC: 12 MG/DL
CALCIUM SERPL-MCNC: 9.7 MG/DL
CCP AB SER IA-ACNC: <8 UNITS
CHLORIDE SERPL-SCNC: 103 MMOL/L
CO2 SERPL-SCNC: 24 MMOL/L
CREAT SERPL-MCNC: 0.75 MG/DL
CRP SERPL-MCNC: <3 MG/L
EGFR: 104 ML/MIN/1.73M2
EOSINOPHIL # BLD AUTO: 0.22 K/UL
EOSINOPHIL NFR BLD AUTO: 1.7 %
ERYTHROCYTE [SEDIMENTATION RATE] IN BLOOD BY WESTERGREN METHOD: 13 MM/HR
GLUCOSE SERPL-MCNC: 78 MG/DL
HCT VFR BLD CALC: 45.7 %
HGB BLD-MCNC: 15.2 G/DL
IMM GRANULOCYTES NFR BLD AUTO: 0.2 %
LYMPHOCYTES # BLD AUTO: 3.92 K/UL
LYMPHOCYTES NFR BLD AUTO: 30.2 %
MAN DIFF?: NORMAL
MCHC RBC-ENTMCNC: 29.9 PG
MCHC RBC-ENTMCNC: 33.3 GM/DL
MCV RBC AUTO: 89.8 FL
MONOCYTES # BLD AUTO: 0.93 K/UL
MONOCYTES NFR BLD AUTO: 7.2 %
NEUTROPHILS # BLD AUTO: 7.84 K/UL
NEUTROPHILS NFR BLD AUTO: 60.2 %
PLATELET # BLD AUTO: 298 K/UL
POTASSIUM SERPL-SCNC: 4.2 MMOL/L
PROT SERPL-MCNC: 7.4 G/DL
RBC # BLD: 5.09 M/UL
RBC # FLD: 12.6 %
RF+CCP IGG SER-IMP: NEGATIVE
RHEUMATOID FACT SER QL: <10 IU/ML
SODIUM SERPL-SCNC: 142 MMOL/L
URATE SERPL-MCNC: 5.7 MG/DL
WBC # FLD AUTO: 13 K/UL

## 2022-03-27 ENCOUNTER — NON-APPOINTMENT (OUTPATIENT)
Age: 57
End: 2022-03-27

## 2022-04-18 ENCOUNTER — RX RENEWAL (OUTPATIENT)
Age: 57
End: 2022-04-18

## 2022-06-06 ENCOUNTER — RX RENEWAL (OUTPATIENT)
Age: 57
End: 2022-06-06

## 2022-11-06 ENCOUNTER — RX RENEWAL (OUTPATIENT)
Age: 57
End: 2022-11-06

## 2022-11-07 ENCOUNTER — RX RENEWAL (OUTPATIENT)
Age: 57
End: 2022-11-07

## 2022-12-14 ENCOUNTER — RX RENEWAL (OUTPATIENT)
Age: 57
End: 2022-12-14

## 2022-12-16 ENCOUNTER — RX RENEWAL (OUTPATIENT)
Age: 57
End: 2022-12-16

## 2022-12-22 ENCOUNTER — APPOINTMENT (OUTPATIENT)
Dept: FAMILY MEDICINE | Facility: CLINIC | Age: 57
End: 2022-12-22

## 2022-12-22 VITALS
OXYGEN SATURATION: 96 % | WEIGHT: 158 LBS | RESPIRATION RATE: 14 BRPM | BODY MASS INDEX: 23.4 KG/M2 | SYSTOLIC BLOOD PRESSURE: 160 MMHG | HEIGHT: 69 IN | DIASTOLIC BLOOD PRESSURE: 94 MMHG | TEMPERATURE: 98.1 F | HEART RATE: 78 BPM

## 2022-12-22 VITALS — SYSTOLIC BLOOD PRESSURE: 144 MMHG | DIASTOLIC BLOOD PRESSURE: 90 MMHG

## 2022-12-22 DIAGNOSIS — Z00.00 ENCOUNTER FOR GENERAL ADULT MEDICAL EXAMINATION W/OUT ABNORMAL FINDINGS: ICD-10-CM

## 2022-12-22 DIAGNOSIS — F10.91 ALCOHOL USE, UNSPECIFIED, IN REMISSION: ICD-10-CM

## 2022-12-22 DIAGNOSIS — Z23 ENCOUNTER FOR IMMUNIZATION: ICD-10-CM

## 2022-12-22 DIAGNOSIS — H02.401 UNSPECIFIED PTOSIS OF RIGHT EYELID: ICD-10-CM

## 2022-12-22 DIAGNOSIS — G56.02 CARPAL TUNNEL SYNDROME, LEFT UPPER LIMB: ICD-10-CM

## 2022-12-22 DIAGNOSIS — G56.01 CARPAL TUNNEL SYNDROME, RIGHT UPPER LIMB: ICD-10-CM

## 2022-12-22 PROCEDURE — G0444 DEPRESSION SCREEN ANNUAL: CPT | Mod: 59

## 2022-12-22 PROCEDURE — 90686 IIV4 VACC NO PRSV 0.5 ML IM: CPT

## 2022-12-22 PROCEDURE — 99396 PREV VISIT EST AGE 40-64: CPT | Mod: 25

## 2022-12-22 PROCEDURE — G0008: CPT

## 2022-12-22 NOTE — HEALTH RISK ASSESSMENT
[Good] : ~his/her~ current health as good [Never] : Never [No] : In the past 12 months have you used drugs other than those required for medical reasons? No [Patient reported colonoscopy was normal] : Patient reported colonoscopy was normal [With Family] : lives with family [# of Members in Household ___] :  household currently consist of [unfilled] member(s) [Employed] : employed [] :  [# Of Children ___] : has [unfilled] children [Sexually Active] : sexually active [Feels Safe at Home] : Feels safe at home [Fully functional (bathing, dressing, toileting, transferring, walking, feeding)] : Fully functional (bathing, dressing, toileting, transferring, walking, feeding) [Fully functional (using the telephone, shopping, preparing meals, housekeeping, doing laundry, using] : Fully functional and needs no help or supervision to perform IADLs (using the telephone, shopping, preparing meals, housekeeping, doing laundry, using transportation, managing medications and managing finances) [Reports normal functional visual acuity (ie: able to read med bottle)] : Reports normal functional visual acuity [Fair] :  ~his/her~ mood as fair [No falls in past year] : Patient reported no falls in the past year [2] : 2) Feeling down, depressed, or hopeless for more than half of the days (2) [PHQ-2 Positive] : PHQ-2 Positive [1/2 of Days or More (2)] : 4.) Feeling tired or having little energy? Half the days or more [Several Days (1)] : 7.) Trouble concentrating on things, such as reading a newspaper or watching television? Several days [Not at All (0)] : 8.) Moving or speaking so slowly that other people could have noticed, or the opposite, moving or speaking faster than usual? Not at all [Mild] : severity of depression is mild [Very Difficult] : How difficult have these problems made it for you to do your work, take care of things at home, or get along with people? 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

## 2022-12-22 NOTE — REVIEW OF SYSTEMS
[Fatigue] : fatigue [Hearing Loss] : hearing loss [Joint Pain] : joint pain [Joint Stiffness] : joint stiffness [Back Pain] : back pain [Joint Swelling] : joint swelling [Anxiety] : anxiety [Depression] : depression [Negative] : Heme/Lymph [Fever] : no fever [Chills] : no chills [Hot Flashes] : no hot flashes [Night Sweats] : no night sweats [Recent Change In Weight] : ~T no recent weight change [Discharge] : no discharge [Pain] : no pain [Redness] : no redness [Dryness] : no dryness [Vision Problems] : no vision problems [Itching] : no itching [Earache] : no earache [Nosebleeds] : no nosebleeds [Postnasal Drip] : no postnasal drip [Nasal Discharge] : no nasal discharge [Sore Throat] : no sore throat [Hoarseness] : no hoarseness [Muscle Pain] : no muscle pain [Muscle Weakness] : no muscle weakness [Suicidal] : not suicidal [Insomnia] : no insomnia [FreeTextEntry3] : right eyelid ptosis, chronic [FreeTextEntry4] : tinnitus [FreeTextEntry9] : bilateral hands, right ankle

## 2022-12-22 NOTE — END OF VISIT
[FreeTextEntry3] : I, Dr. Underwood, personally performed the evaluation and management (E/M) services for this established patient who presents today with (a) new problem(s)/exacerbation of (an) existing condition(s). That E/M includes conducting the examination, assessing all new/exacerbated conditions, and establishing a new plan of care. Today, my nurse practitioner, Harini Ruiz, was here to observe my evaluation and management services fort his new problem/exacerbated condition to be follow going forward.\par

## 2022-12-22 NOTE — HISTORY OF PRESENT ILLNESS
[FreeTextEntry1] : 56 yo male with PMHx HTN (lisinopril) GERD (omeprazole) presents to the office for a physical.  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 sometimes feels depressed/anxious during winter months. denies any SI/HI, admits to feeling anxiety, lack of motivation. is supposed to have follow up for possible surgery for ptosis of right eye, also has tinnitus in right ear that is worsening and 'worsening my anxiety' is supposed to have a sleep study to r/o sleep apnea prior to any additional eye surgeries. reports blood pressure readings at home have been elevated, currently taking lisinopril 20mg QD.

## 2022-12-22 NOTE — PHYSICAL EXAM
[PERRL] : pupils equal round and reactive to light [EOMI] : extraocular movements intact [Normal Rate] : normal rate  [Regular Rhythm] : with a regular rhythm [Normal S1, S2] : normal S1 and S2 [Declined Rectal Exam] : declined rectal exam [Normal] : no CVA or spinal tenderness [Grossly Normal Strength/Tone] : grossly normal strength/tone [No Rash] : no rash [Coordination Grossly Intact] : coordination grossly intact [No Focal Deficits] : no focal deficits [Normal Gait] : normal gait [Speech Grossly Normal] : speech grossly normal [Alert and Oriented x3] : oriented to person, place, and time [Normal Insight/Judgement] : insight and judgment were intact [de-identified] : ptosis to right eyelid [de-identified] : degenerative changes to bilateral hands, swelling to PIP, DIP joints [de-identified] : verbalizing anxiety, depressiom

## 2023-01-09 ENCOUNTER — TRANSCRIPTION ENCOUNTER (OUTPATIENT)
Age: 58
End: 2023-01-09

## 2023-03-01 ENCOUNTER — RX RENEWAL (OUTPATIENT)
Age: 58
End: 2023-03-01

## 2023-03-01 RX ORDER — LISINOPRIL 20 MG/1
20 TABLET ORAL
Qty: 90 | Refills: 1 | Status: ACTIVE | COMMUNITY
Start: 2018-07-26 | End: 1900-01-01

## 2023-07-11 ENCOUNTER — APPOINTMENT (OUTPATIENT)
Dept: FAMILY MEDICINE | Facility: CLINIC | Age: 58
End: 2023-07-11
Payer: MEDICAID

## 2023-07-11 VITALS
HEART RATE: 66 BPM | RESPIRATION RATE: 14 BRPM | DIASTOLIC BLOOD PRESSURE: 68 MMHG | SYSTOLIC BLOOD PRESSURE: 118 MMHG | OXYGEN SATURATION: 97 % | TEMPERATURE: 98 F

## 2023-07-11 DIAGNOSIS — R06.83 SNORING: ICD-10-CM

## 2023-07-11 DIAGNOSIS — E78.5 HYPERLIPIDEMIA, UNSPECIFIED: ICD-10-CM

## 2023-07-11 DIAGNOSIS — I10 ESSENTIAL (PRIMARY) HYPERTENSION: ICD-10-CM

## 2023-07-11 DIAGNOSIS — R53.83 OTHER FATIGUE: ICD-10-CM

## 2023-07-11 DIAGNOSIS — M54.9 DORSALGIA, UNSPECIFIED: ICD-10-CM

## 2023-07-11 DIAGNOSIS — M15.9 POLYOSTEOARTHRITIS, UNSPECIFIED: ICD-10-CM

## 2023-07-11 DIAGNOSIS — R73.03 PREDIABETES.: ICD-10-CM

## 2023-07-11 LAB
CHOLEST SERPL-MCNC: 222 MG/DL
ESTIMATED AVERAGE GLUCOSE: 114 MG/DL
HBA1C MFR BLD HPLC: 5.6 %
HDLC SERPL-MCNC: 54 MG/DL
LDLC SERPL CALC-MCNC: 154 MG/DL
NONHDLC SERPL-MCNC: 168 MG/DL
TRIGL SERPL-MCNC: 68 MG/DL

## 2023-07-11 PROCEDURE — 99214 OFFICE O/P EST MOD 30 MIN: CPT

## 2023-07-11 NOTE — REVIEW OF SYSTEMS
[Fatigue] : fatigue [Dyspnea on Exertion] : dyspnea on exertion [Joint Pain] : joint pain [Joint Stiffness] : joint stiffness [Muscle Pain] : muscle pain [Back Pain] : back pain [Insomnia] : insomnia [Anxiety] : anxiety [Negative] : Neurological [Fever] : no fever [Chills] : no chills [Hot Flashes] : no hot flashes [Night Sweats] : no night sweats [Recent Change In Weight] : ~T no recent weight change [Shortness Of Breath] : no shortness of breath [Wheezing] : no wheezing [Cough] : no cough [Muscle Weakness] : no muscle weakness [Joint Swelling] : no joint swelling [Suicidal] : not suicidal [Depression] : no depression [FreeTextEntry9] : chronic

## 2023-07-11 NOTE — PHYSICAL EXAM
[Normal Rate] : normal rate  [Regular Rhythm] : with a regular rhythm [Normal S1, S2] : normal S1 and S2 [No Masses] : no palpable masses [No CVA Tenderness] : no CVA  tenderness [No Spinal Tenderness] : no spinal tenderness [Coordination Grossly Intact] : coordination grossly intact [No Focal Deficits] : no focal deficits [Normal Gait] : normal gait [Speech Grossly Normal] : speech grossly normal [Alert and Oriented x3] : oriented to person, place, and time [Normal Mood] : the mood was normal [Normal] : affect was normal and insight and judgment were intact

## 2023-07-11 NOTE — HISTORY OF PRESENT ILLNESS
[FreeTextEntry1] : 57 yo male prediabetes, HTN (lisinopril) HLD, GERD (omeprazole) presents to the office for a follow up on labs, fatigue.  [de-identified] : the patient reports increasing fatigue, feels 'slower' than usual, reports some 'shortness of breath' during activity, denies any chest pain, dizziness, palpitations. doesn't sleep well at night. has been trying to eat healthier, but admits for the past week, not adhering to healthy diet. reports feeling more generalized stress.\par

## 2023-07-31 ENCOUNTER — APPOINTMENT (OUTPATIENT)
Dept: OTOLARYNGOLOGY | Facility: CLINIC | Age: 58
End: 2023-07-31
Payer: MEDICAID

## 2023-07-31 VITALS
BODY MASS INDEX: 22.22 KG/M2 | HEART RATE: 67 BPM | DIASTOLIC BLOOD PRESSURE: 88 MMHG | HEIGHT: 69 IN | SYSTOLIC BLOOD PRESSURE: 161 MMHG | WEIGHT: 150 LBS

## 2023-07-31 DIAGNOSIS — H90.3 SENSORINEURAL HEARING LOSS, BILATERAL: ICD-10-CM

## 2023-07-31 DIAGNOSIS — H61.23 IMPACTED CERUMEN, BILATERAL: ICD-10-CM

## 2023-07-31 DIAGNOSIS — H93.19 TINNITUS, UNSPECIFIED EAR: ICD-10-CM

## 2023-07-31 PROCEDURE — G0268 REMOVAL OF IMPACTED WAX MD: CPT

## 2023-07-31 PROCEDURE — 92567 TYMPANOMETRY: CPT

## 2023-07-31 PROCEDURE — 99213 OFFICE O/P EST LOW 20 MIN: CPT | Mod: 25

## 2023-07-31 PROCEDURE — 92557 COMPREHENSIVE HEARING TEST: CPT

## 2023-07-31 RX ORDER — DICLOFENAC SODIUM 1% 10 MG/G
1 GEL TOPICAL
Qty: 1 | Refills: 2 | Status: COMPLETED | COMMUNITY
Start: 2022-03-15 | End: 2023-07-31

## 2023-07-31 RX ORDER — CYCLOBENZAPRINE HYDROCHLORIDE 5 MG/1
5 TABLET, FILM COATED ORAL
Qty: 15 | Refills: 0 | Status: COMPLETED | COMMUNITY
Start: 2022-03-15 | End: 2023-07-31

## 2023-07-31 NOTE — HISTORY OF PRESENT ILLNESS
[de-identified] : 59yo male with right asymmetric SNHL. had mri without retrocochlear pathology. he is noting he had a hearing aid but it broke and he needs a new one. was only wearing in one ear. he is asking about bilateral HA. he also has tinnitus.

## 2023-07-31 NOTE — PROCEDURE
[FreeTextEntry3] : Cerumen impaction removed with curette. After removal of cerumen canal noted to be normal without edema, purulence or inflammation. Patient tolerated procedure well.

## 2023-07-31 NOTE — ASSESSMENT
[FreeTextEntry1] : asymmetric SNHL: - reviewed audio with patient - recommend HA in both ears and given clearance with list of HA vendors that accept his insurance - f/u annual

## 2023-09-17 ENCOUNTER — RX RENEWAL (OUTPATIENT)
Age: 58
End: 2023-09-17

## 2023-10-13 ENCOUNTER — APPOINTMENT (OUTPATIENT)
Dept: FAMILY MEDICINE | Facility: CLINIC | Age: 58
End: 2023-10-13

## 2023-10-25 ENCOUNTER — EMERGENCY (EMERGENCY)
Facility: HOSPITAL | Age: 58
LOS: 1 days | Discharge: HOME | End: 2023-10-25
Attending: EMERGENCY MEDICINE
Payer: MEDICAID

## 2023-10-25 VITALS
HEIGHT: 69 IN | SYSTOLIC BLOOD PRESSURE: 145 MMHG | WEIGHT: 145.95 LBS | RESPIRATION RATE: 18 BRPM | DIASTOLIC BLOOD PRESSURE: 88 MMHG | TEMPERATURE: 99 F | OXYGEN SATURATION: 99 % | HEART RATE: 79 BPM

## 2023-10-25 VITALS
HEART RATE: 75 BPM | TEMPERATURE: 98 F | RESPIRATION RATE: 18 BRPM | DIASTOLIC BLOOD PRESSURE: 76 MMHG | SYSTOLIC BLOOD PRESSURE: 126 MMHG | OXYGEN SATURATION: 97 %

## 2023-10-25 PROCEDURE — 73502 X-RAY EXAM HIP UNI 2-3 VIEWS: CPT | Mod: 26,RT

## 2023-10-25 PROCEDURE — 72100 X-RAY EXAM L-S SPINE 2/3 VWS: CPT | Mod: 26

## 2023-10-25 PROCEDURE — 99284 EMERGENCY DEPT VISIT MOD MDM: CPT | Mod: 25

## 2023-10-25 PROCEDURE — 72100 X-RAY EXAM L-S SPINE 2/3 VWS: CPT

## 2023-10-25 PROCEDURE — 73502 X-RAY EXAM HIP UNI 2-3 VIEWS: CPT

## 2023-10-25 PROCEDURE — 74176 CT ABD & PELVIS W/O CONTRAST: CPT | Mod: MA

## 2023-10-25 PROCEDURE — 72131 CT LUMBAR SPINE W/O DYE: CPT | Mod: 26,MA

## 2023-10-25 PROCEDURE — 99284 EMERGENCY DEPT VISIT MOD MDM: CPT

## 2023-10-25 PROCEDURE — 74176 CT ABD & PELVIS W/O CONTRAST: CPT | Mod: 26,MA

## 2023-10-25 RX ORDER — IBUPROFEN 200 MG
600 TABLET ORAL ONCE
Refills: 0 | Status: COMPLETED | OUTPATIENT
Start: 2023-10-25 | End: 2023-10-25

## 2023-10-25 RX ORDER — ACETAMINOPHEN 500 MG
975 TABLET ORAL ONCE
Refills: 0 | Status: COMPLETED | OUTPATIENT
Start: 2023-10-25 | End: 2023-10-25

## 2023-10-25 RX ORDER — CYCLOBENZAPRINE HYDROCHLORIDE 10 MG/1
1 TABLET, FILM COATED ORAL
Qty: 15 | Refills: 0
Start: 2023-10-25 | End: 2023-10-29

## 2023-10-25 RX ADMIN — Medication 975 MILLIGRAM(S): at 10:08

## 2023-10-25 RX ADMIN — Medication 600 MILLIGRAM(S): at 10:09

## 2023-10-25 RX ADMIN — Medication 600 MILLIGRAM(S): at 11:43

## 2023-10-25 RX ADMIN — Medication 975 MILLIGRAM(S): at 11:43

## 2023-10-25 NOTE — ED PROVIDER NOTE - NSFOLLOWUPINSTRUCTIONS_ED_ALL_ED_FT
1.  Stay well hydrated  2.  Take Tylenol 1000mgs every 6-8 hrs and/or Ibuprofen 600mgs every 6 hrs as needed for pain.  Take Ibuprofen with food  3.  Follow up with your PMD in 2-3 days.  Bring a copy of your results with you to your appointment.  4.  Follow up with orthopedics within one week of discharge  5.  Return to the ER for worsening pain, difficulty walking, fecal/urinary incontinence or any other concerning symptoms 1.  Stay well hydrated  2.  Take Tylenol 1000mgs every 6-8 hrs and/or Ibuprofen 600mgs every 6 hrs as needed for pain.  Take Ibuprofen with food  Take Flexeril 5-10mgs every 8 hrs as needed for severe pain/muscle spasm  3.  Follow up with your PMD in 2-3 days.  Bring a copy of your results with you to your appointment. Please discuss incidentally noted liver lesions seen on CT scan.  4.  Follow up with orthopedics within one week of discharge  5.  Return to the ER for worsening pain, difficulty walking, fecal/urinary incontinence or any other concerning symptoms

## 2023-10-25 NOTE — ED PROVIDER NOTE - PATIENT PORTAL LINK FT
You can access the FollowMyHealth Patient Portal offered by [facility] by registering at the following website: [email or web address]. By joining [facility]’s FollowMyHealth portal, you will also be able to view your health information using other applications (apps) compatible with our system. You can access the FollowMyHealth Patient Portal offered by [facility] by registering at the following website: [email or web address]. By joining [employer]’s FollowMyHealth portal, you will also be able to view your health information using other applications (apps) compatible with our system.

## 2023-10-25 NOTE — ED ADULT NURSE NOTE - OBJECTIVE STATEMENT
58 y.o M BIB self p/w c/o R groin pain. A+Ox4. Pt states x15 days ago tripped and feel down a step, about "8 inches", and landed on R side. Reports pain starts in R lower back, radiates to front R groin, and down R leg. Endorsing numbness and tingling of R leg. Rates pain 9/10 on pain scale. States has been taking alleve w/ relief, but has had decreased sleep and unable to go to work past x2 days from constant pain. Denies testicular pain/ swelling, urinary sx, bloody stools, CP, SOB, f/v, n/v/d. No other complaints at this time, safety maintained.

## 2023-10-25 NOTE — ED PROVIDER NOTE - NSICDXPASTMEDICALHX_GEN_ALL_CORE_FT
PAST MEDICAL HISTORY:  Alcohol abuse     GERD (gastroesophageal reflux disease)     Hypertension     Pancreatitis, alcoholic

## 2023-10-25 NOTE — ED PROVIDER NOTE - CARE PLAN
1 Principal Discharge DX:	Rt groin pain  Secondary Diagnosis:	Pain of back and right lower extremity

## 2023-10-25 NOTE — ED PROVIDER NOTE - ATTENDING APP SHARED VISIT CONTRIBUTION OF CARE
Attending MD Kaye:   I personally have seen and examined this patient.  Physician assistant note reviewed and agree on plan of care and except where noted.  See below for details.     Seen in [place] [facility]    58M with PMH/PSH including former EtOH abuse, HTN, distant trauma s/p foot surgery presents to the ED with R hip and lower back pain s/p fall two weeks ago.  Reports slipped off one step about two weeks ago, landing on R hip/R buttock/R lower back.  Reports since then has been ambulating and working (works construction) but has had persistent waxing and waning pain.  Reports has been taking Aleve with transient relief.  Reports over the last few days has had more pain with ambulation and with certain movements.  Denies preceding dizziness, weakness, sensory changes.  Denies LOC, head trauma.  Denies chest pain, shortness of breath, abdominal pain, nausea, vomiting, diarrhea, urinary complaints. Denies loss of urinary or bowel continence. Denies numbness, weakness or tingling in extremities.     Exam:   General: NAD  HENT: head NCAT, airway patent  Eyes: anicteric, no conjunctival injection   Lungs: lungs CTAB with good inspiratory effort, no wheezing, no rhonchi, no rales  Cardiac: +S1S2, no obvious m/r/g  GI: abdomen soft with +BS, NT, ND  : no CVAT  MSK: ranging neck and extremities freely, no midline spine tenderness cervical and thoracic, equivocal +L spine tenderness to palpation, +R buttock pain, +R hip tenderness to palpation, FROM R hip and R knee  Neuro: moving all extremities spontaneously, nonfocal, no saddle anesthesia  Psych: normal mood and affect    A/P: 58M with R lower back, R buttock, R hip pain, will obtain XR L spine, hip w pelvis, will give analgesia, if unimproved, will consider CT scan

## 2023-10-25 NOTE — ED PROVIDER NOTE - OBJECTIVE STATEMENT
58-year-old male with past medical history of former EtOH abuse, HTN presenting with right groin pain.  Patient reports that he tripped on a step 2 weeks ago and landed on his right buttocks.  Since then patient has had waxing and waning right superior buttock/back pain, right hip/groin pain with radiation into the anterior and posterior aspects of the right thigh.  Patient reports that he has been taking Aleve with improvement of symptoms, but states after a few hours his pain returns.  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  Patient otherwise denies head trauma, LOC, chest pain, shortness of breath, abdominal pain, weakness, numbness, fecal/urinary incontinence, saddle anesthesia.

## 2023-10-25 NOTE — ED PROVIDER NOTE - PROGRESS NOTE DETAILS
Attending MD Kaye: Patient reassessed, reports persistent unchanged pain, will obtain CT CT negative for acute pathology.  Lumbar spine with degenerative changes and foraminal narrowing.  Liver nodules incidentally noted on CT scan.  Patient made aware.  Reports that he has been told this in the past, but has not followed up.  Will make referral to cancer care direct.  Patient to follow-up with ortho/spine for further treatment.  Patient with some improvement s/p Tylenol/ibuprofen.  Will DC home on Flexeril.  Strict follow-up and return precautions. -Rasta Miller PA-C

## 2023-10-25 NOTE — ED PROVIDER NOTE - NSFOLLOWUPCLINICS_GEN_ALL_ED_FT
[facility]  Orthopedic Surgery  [address] [address]  Phone: [phone]  Fax:

## 2023-10-25 NOTE — ED PROVIDER NOTE - PHYSICAL EXAMINATION
MSK: No midline cervical/thoracic/lumbar TTP, mild right superior buttock ttp. no hip TTP, normal ROM,

## 2023-10-27 LAB
CHOLEST SERPL-MCNC: 198 MG/DL
HDLC SERPL-MCNC: 55 MG/DL
LDLC SERPL CALC-MCNC: 122 MG/DL
NONHDLC SERPL-MCNC: 143 MG/DL
TRIGL SERPL-MCNC: 115 MG/DL

## 2023-10-30 ENCOUNTER — APPOINTMENT (OUTPATIENT)
Dept: ORTHOPEDIC SURGERY | Facility: CLINIC | Age: 58
End: 2023-10-30
Payer: MEDICAID

## 2023-10-30 VITALS — WEIGHT: 144 LBS | HEIGHT: 68 IN | BODY MASS INDEX: 21.82 KG/M2

## 2023-10-30 DIAGNOSIS — M43.16 SPONDYLOLISTHESIS, LUMBAR REGION: ICD-10-CM

## 2023-10-30 PROCEDURE — 99203 OFFICE O/P NEW LOW 30 MIN: CPT

## 2023-10-31 DIAGNOSIS — M25.551 PAIN IN RIGHT HIP: ICD-10-CM

## 2023-10-31 LAB
AFP-TM SERPL-MCNC: 2.6 NG/ML
ALBUMIN SERPL ELPH-MCNC: 4.6 G/DL
ALP BLD-CCNC: 83 U/L
ALT SERPL-CCNC: 25 U/L
ANION GAP SERPL CALC-SCNC: 12 MMOL/L
AST SERPL-CCNC: 22 U/L
BASOPHILS # BLD AUTO: 0.05 K/UL
BASOPHILS NFR BLD AUTO: 0.7 %
BILIRUB SERPL-MCNC: 0.6 MG/DL
BUN SERPL-MCNC: 12 MG/DL
CALCIUM SERPL-MCNC: 9.6 MG/DL
CANCER AG19-9 SERPL-ACNC: 22 U/ML
CEA SERPL-MCNC: 2.1 NG/ML
CHLORIDE SERPL-SCNC: 105 MMOL/L
CO2 SERPL-SCNC: 26 MMOL/L
CREAT SERPL-MCNC: 0.58 MG/DL
EGFR: 112 ML/MIN/1.73M2
EOSINOPHIL # BLD AUTO: 0.28 K/UL
EOSINOPHIL NFR BLD AUTO: 3.7 %
GLUCOSE SERPL-MCNC: 120 MG/DL
HCT VFR BLD CALC: 43.2 %
HGB BLD-MCNC: 14.2 G/DL
IMM GRANULOCYTES NFR BLD AUTO: 0.7 %
INR PPP: 0.98 RATIO
LYMPHOCYTES # BLD AUTO: 2.96 K/UL
LYMPHOCYTES NFR BLD AUTO: 38.7 %
MAN DIFF?: NORMAL
MCHC RBC-ENTMCNC: 30.2 PG
MCHC RBC-ENTMCNC: 32.9 GM/DL
MCV RBC AUTO: 91.9 FL
MONOCYTES # BLD AUTO: 0.54 K/UL
MONOCYTES NFR BLD AUTO: 7.1 %
NEUTROPHILS # BLD AUTO: 3.76 K/UL
NEUTROPHILS NFR BLD AUTO: 49.1 %
PLATELET # BLD AUTO: 271 K/UL
POTASSIUM SERPL-SCNC: 4.5 MMOL/L
PROT SERPL-MCNC: 7 G/DL
PT BLD: 10.8 SEC
RBC # BLD: 4.7 M/UL
RBC # FLD: 13.1 %
SODIUM SERPL-SCNC: 143 MMOL/L
WBC # FLD AUTO: 7.64 K/UL

## 2023-11-01 ENCOUNTER — APPOINTMENT (OUTPATIENT)
Dept: ORTHOPEDIC SURGERY | Facility: CLINIC | Age: 58
End: 2023-11-01
Payer: COMMERCIAL

## 2023-11-01 VITALS — DIASTOLIC BLOOD PRESSURE: 102 MMHG | SYSTOLIC BLOOD PRESSURE: 161 MMHG | HEART RATE: 65 BPM

## 2023-11-01 VITALS — HEIGHT: 68 IN | WEIGHT: 144 LBS | BODY MASS INDEX: 21.82 KG/M2

## 2023-11-01 PROCEDURE — 99214 OFFICE O/P EST MOD 30 MIN: CPT

## 2023-11-01 PROCEDURE — 73502 X-RAY EXAM HIP UNI 2-3 VIEWS: CPT

## 2023-11-01 RX ORDER — CELECOXIB 100 MG/1
100 CAPSULE ORAL TWICE DAILY
Qty: 60 | Refills: 2 | Status: ACTIVE | COMMUNITY
Start: 2023-11-01 | End: 1900-01-01

## 2023-11-02 ENCOUNTER — APPOINTMENT (OUTPATIENT)
Dept: FAMILY MEDICINE | Facility: CLINIC | Age: 58
End: 2023-11-02
Payer: COMMERCIAL

## 2023-11-02 VITALS
WEIGHT: 144 LBS | SYSTOLIC BLOOD PRESSURE: 141 MMHG | HEIGHT: 68 IN | BODY MASS INDEX: 21.82 KG/M2 | RESPIRATION RATE: 16 BRPM | HEART RATE: 67 BPM | OXYGEN SATURATION: 97 % | TEMPERATURE: 99.1 F | DIASTOLIC BLOOD PRESSURE: 80 MMHG

## 2023-11-02 DIAGNOSIS — M16.11 UNILATERAL PRIMARY OSTEOARTHRITIS, RIGHT HIP: ICD-10-CM

## 2023-11-02 DIAGNOSIS — K76.9 LIVER DISEASE, UNSPECIFIED: ICD-10-CM

## 2023-11-02 DIAGNOSIS — M19.90 UNSPECIFIED OSTEOARTHRITIS, UNSPECIFIED SITE: ICD-10-CM

## 2023-11-02 DIAGNOSIS — F41.8 OTHER SPECIFIED ANXIETY DISORDERS: ICD-10-CM

## 2023-11-02 PROCEDURE — 99215 OFFICE O/P EST HI 40 MIN: CPT

## 2023-11-04 ENCOUNTER — APPOINTMENT (OUTPATIENT)
Dept: MRI IMAGING | Facility: CLINIC | Age: 58
End: 2023-11-04
Payer: COMMERCIAL

## 2023-11-04 PROCEDURE — 74183 MRI ABD W/O CNTR FLWD CNTR: CPT

## 2023-11-04 PROCEDURE — A9585: CPT

## 2023-11-06 PROBLEM — D18.03 HEMANGIOMA OF LIVER: Status: ACTIVE | Noted: 2023-11-06

## 2023-11-09 ENCOUNTER — APPOINTMENT (OUTPATIENT)
Dept: SURGICAL ONCOLOGY | Facility: CLINIC | Age: 58
End: 2023-11-09
Payer: COMMERCIAL

## 2023-11-09 VITALS
WEIGHT: 144 LBS | BODY MASS INDEX: 21.82 KG/M2 | DIASTOLIC BLOOD PRESSURE: 80 MMHG | SYSTOLIC BLOOD PRESSURE: 126 MMHG | HEIGHT: 68 IN | OXYGEN SATURATION: 99 % | HEART RATE: 60 BPM

## 2023-11-09 DIAGNOSIS — D18.03 HEMANGIOMA OF INTRA-ABDOMINAL STRUCTURES: ICD-10-CM

## 2023-11-09 PROCEDURE — 99205 OFFICE O/P NEW HI 60 MIN: CPT

## 2023-11-10 ENCOUNTER — RESULT REVIEW (OUTPATIENT)
Age: 58
End: 2023-11-10

## 2023-11-10 ENCOUNTER — APPOINTMENT (OUTPATIENT)
Dept: RADIOLOGY | Facility: CLINIC | Age: 58
End: 2023-11-10
Payer: COMMERCIAL

## 2023-11-10 ENCOUNTER — OUTPATIENT (OUTPATIENT)
Dept: OUTPATIENT SERVICES | Facility: HOSPITAL | Age: 58
LOS: 1 days | End: 2023-11-10
Payer: COMMERCIAL

## 2023-11-10 DIAGNOSIS — M16.11 UNILATERAL PRIMARY OSTEOARTHRITIS, RIGHT HIP: ICD-10-CM

## 2023-11-10 PROCEDURE — 73525 CONTRAST X-RAY OF HIP: CPT | Mod: 26,RT

## 2023-11-10 PROCEDURE — 27093 INJECTION FOR HIP X-RAY: CPT

## 2023-11-10 PROCEDURE — 27093 INJECTION FOR HIP X-RAY: CPT | Mod: RT

## 2023-11-10 PROCEDURE — 73525 CONTRAST X-RAY OF HIP: CPT

## 2024-05-31 ENCOUNTER — RX RENEWAL (OUTPATIENT)
Age: 59
End: 2024-05-31

## 2024-05-31 RX ORDER — MELOXICAM 15 MG/1
15 TABLET ORAL
Qty: 30 | Refills: 1 | Status: ACTIVE | COMMUNITY
Start: 2018-08-14 | End: 1900-01-01

## 2024-09-12 DIAGNOSIS — Z00.00 ENCOUNTER FOR GENERAL ADULT MEDICAL EXAMINATION W/OUT ABNORMAL FINDINGS: ICD-10-CM

## 2024-09-21 LAB
25(OH)D3 SERPL-MCNC: 33.5 NG/ML
ALBUMIN SERPL ELPH-MCNC: 4.4 G/DL
ALP BLD-CCNC: 84 U/L
ALT SERPL-CCNC: 14 U/L
ANION GAP SERPL CALC-SCNC: 11 MMOL/L
APPEARANCE: CLEAR
AST SERPL-CCNC: 19 U/L
BACTERIA: NEGATIVE /HPF
BASOPHILS # BLD AUTO: 0.03 K/UL
BASOPHILS NFR BLD AUTO: 0.4 %
BILIRUB SERPL-MCNC: 1.3 MG/DL
BILIRUBIN URINE: NEGATIVE
BLOOD URINE: NEGATIVE
BUN SERPL-MCNC: 11 MG/DL
CALCIUM SERPL-MCNC: 9.2 MG/DL
CAST: 0 /LPF
CHLORIDE SERPL-SCNC: 104 MMOL/L
CHOLEST SERPL-MCNC: 190 MG/DL
CO2 SERPL-SCNC: 25 MMOL/L
COLOR: YELLOW
CREAT SERPL-MCNC: 0.69 MG/DL
EGFR: 105 ML/MIN/1.73M2
EOSINOPHIL # BLD AUTO: 0.2 K/UL
EOSINOPHIL NFR BLD AUTO: 2.8 %
EPITHELIAL CELLS: 0 /HPF
ESTIMATED AVERAGE GLUCOSE: 108 MG/DL
FOLATE SERPL-MCNC: 17.4 NG/ML
GLUCOSE QUALITATIVE U: NEGATIVE MG/DL
GLUCOSE SERPL-MCNC: 109 MG/DL
HBA1C MFR BLD HPLC: 5.4 %
HCT VFR BLD CALC: 42.2 %
HDLC SERPL-MCNC: 54 MG/DL
HGB BLD-MCNC: 13.8 G/DL
IMM GRANULOCYTES NFR BLD AUTO: 0.3 %
IRON SERPL-MCNC: 71 UG/DL
KETONES URINE: NEGATIVE MG/DL
LDLC SERPL CALC-MCNC: 123 MG/DL
LEUKOCYTE ESTERASE URINE: NEGATIVE
LYMPHOCYTES # BLD AUTO: 2.14 K/UL
LYMPHOCYTES NFR BLD AUTO: 30.4 %
MAN DIFF?: NORMAL
MCHC RBC-ENTMCNC: 30.5 PG
MCHC RBC-ENTMCNC: 32.7 GM/DL
MCV RBC AUTO: 93.2 FL
MICROSCOPIC-UA: NORMAL
MONOCYTES # BLD AUTO: 0.7 K/UL
MONOCYTES NFR BLD AUTO: 10 %
NEUTROPHILS # BLD AUTO: 3.94 K/UL
NEUTROPHILS NFR BLD AUTO: 56.1 %
NITRITE URINE: NEGATIVE
NONHDLC SERPL-MCNC: 136 MG/DL
PH URINE: 7.5
PLATELET # BLD AUTO: 236 K/UL
POTASSIUM SERPL-SCNC: 4.3 MMOL/L
PROT SERPL-MCNC: 6.7 G/DL
PROTEIN URINE: NEGATIVE MG/DL
PSA FREE FLD-MCNC: 21 %
PSA FREE SERPL-MCNC: 0.26 NG/ML
PSA SERPL-MCNC: 1.23 NG/ML
RBC # BLD: 4.53 M/UL
RBC # FLD: 13.2 %
RED BLOOD CELLS URINE: 1 /HPF
SODIUM SERPL-SCNC: 140 MMOL/L
SPECIFIC GRAVITY URINE: 1.02
T4 FREE SERPL-MCNC: 1.1 NG/DL
TRIGL SERPL-MCNC: 71 MG/DL
TSH SERPL-ACNC: 1.86 UIU/ML
UROBILINOGEN URINE: 0.2 MG/DL
VIT B12 SERPL-MCNC: 639 PG/ML
WBC # FLD AUTO: 7.03 K/UL
WHITE BLOOD CELLS URINE: 0 /HPF

## 2024-09-24 ENCOUNTER — INPATIENT (INPATIENT)
Facility: HOSPITAL | Age: 59
LOS: 0 days | Discharge: LEFT BEFORE DISCHARGE | DRG: 311 | End: 2024-09-25
Attending: STUDENT IN AN ORGANIZED HEALTH CARE EDUCATION/TRAINING PROGRAM | Admitting: HOSPITALIST
Payer: COMMERCIAL

## 2024-09-24 ENCOUNTER — NON-APPOINTMENT (OUTPATIENT)
Age: 59
End: 2024-09-24

## 2024-09-24 ENCOUNTER — APPOINTMENT (OUTPATIENT)
Dept: FAMILY MEDICINE | Facility: CLINIC | Age: 59
End: 2024-09-24
Payer: COMMERCIAL

## 2024-09-24 VITALS
BODY MASS INDEX: 23.46 KG/M2 | OXYGEN SATURATION: 98 % | HEART RATE: 83 BPM | DIASTOLIC BLOOD PRESSURE: 80 MMHG | TEMPERATURE: 98.2 F | WEIGHT: 154.8 LBS | HEIGHT: 68 IN | RESPIRATION RATE: 16 BRPM | SYSTOLIC BLOOD PRESSURE: 150 MMHG

## 2024-09-24 VITALS
DIASTOLIC BLOOD PRESSURE: 98 MMHG | TEMPERATURE: 99 F | RESPIRATION RATE: 18 BRPM | SYSTOLIC BLOOD PRESSURE: 198 MMHG | OXYGEN SATURATION: 100 % | WEIGHT: 149.91 LBS | HEIGHT: 66 IN | HEART RATE: 86 BPM

## 2024-09-24 DIAGNOSIS — Z00.00 ENCOUNTER FOR GENERAL ADULT MEDICAL EXAMINATION W/OUT ABNORMAL FINDINGS: ICD-10-CM

## 2024-09-24 DIAGNOSIS — M16.11 UNILATERAL PRIMARY OSTEOARTHRITIS, RIGHT HIP: ICD-10-CM

## 2024-09-24 DIAGNOSIS — R07.89 OTHER CHEST PAIN: ICD-10-CM

## 2024-09-24 DIAGNOSIS — M19.90 UNSPECIFIED OSTEOARTHRITIS, UNSPECIFIED SITE: ICD-10-CM

## 2024-09-24 DIAGNOSIS — R07.9 CHEST PAIN, UNSPECIFIED: ICD-10-CM

## 2024-09-24 DIAGNOSIS — R55 SYNCOPE AND COLLAPSE: ICD-10-CM

## 2024-09-24 DIAGNOSIS — I10 ESSENTIAL (PRIMARY) HYPERTENSION: ICD-10-CM

## 2024-09-24 DIAGNOSIS — E78.5 HYPERLIPIDEMIA, UNSPECIFIED: ICD-10-CM

## 2024-09-24 DIAGNOSIS — H02.401 UNSPECIFIED PTOSIS OF RIGHT EYELID: ICD-10-CM

## 2024-09-24 DIAGNOSIS — R06.09 OTHER FORMS OF DYSPNEA: ICD-10-CM

## 2024-09-24 DIAGNOSIS — D18.03 HEMANGIOMA OF INTRA-ABDOMINAL STRUCTURES: ICD-10-CM

## 2024-09-24 DIAGNOSIS — H90.3 SENSORINEURAL HEARING LOSS, BILATERAL: ICD-10-CM

## 2024-09-24 DIAGNOSIS — R42 DIZZINESS AND GIDDINESS: ICD-10-CM

## 2024-09-24 LAB
ALBUMIN SERPL ELPH-MCNC: 4.8 G/DL — SIGNIFICANT CHANGE UP (ref 3.3–5)
ALP SERPL-CCNC: 93 U/L — SIGNIFICANT CHANGE UP (ref 40–120)
ALT FLD-CCNC: 17 U/L — SIGNIFICANT CHANGE UP (ref 10–45)
ANION GAP SERPL CALC-SCNC: 14 MMOL/L — SIGNIFICANT CHANGE UP (ref 5–17)
APTT BLD: 32.7 SEC — SIGNIFICANT CHANGE UP (ref 24.5–35.6)
AST SERPL-CCNC: 18 U/L — SIGNIFICANT CHANGE UP (ref 10–40)
BASOPHILS # BLD AUTO: 0.04 K/UL — SIGNIFICANT CHANGE UP (ref 0–0.2)
BASOPHILS NFR BLD AUTO: 0.3 % — SIGNIFICANT CHANGE UP (ref 0–2)
BILIRUB SERPL-MCNC: 1.5 MG/DL — HIGH (ref 0.2–1.2)
BUN SERPL-MCNC: 14 MG/DL — SIGNIFICANT CHANGE UP (ref 7–23)
CALCIUM SERPL-MCNC: 9.8 MG/DL — SIGNIFICANT CHANGE UP (ref 8.4–10.5)
CHLORIDE SERPL-SCNC: 101 MMOL/L — SIGNIFICANT CHANGE UP (ref 96–108)
CO2 SERPL-SCNC: 24 MMOL/L — SIGNIFICANT CHANGE UP (ref 22–31)
CREAT SERPL-MCNC: 0.67 MG/DL — SIGNIFICANT CHANGE UP (ref 0.5–1.3)
EGFR: 106 ML/MIN/1.73M2 — SIGNIFICANT CHANGE UP
EOSINOPHIL # BLD AUTO: 0.08 K/UL — SIGNIFICANT CHANGE UP (ref 0–0.5)
EOSINOPHIL NFR BLD AUTO: 0.7 % — SIGNIFICANT CHANGE UP (ref 0–6)
GAS PNL BLDV: SIGNIFICANT CHANGE UP
GLUCOSE SERPL-MCNC: 91 MG/DL — SIGNIFICANT CHANGE UP (ref 70–99)
HCT VFR BLD CALC: 42.9 % — SIGNIFICANT CHANGE UP (ref 39–50)
HGB BLD-MCNC: 14.4 G/DL — SIGNIFICANT CHANGE UP (ref 13–17)
IMM GRANULOCYTES NFR BLD AUTO: 0.2 % — SIGNIFICANT CHANGE UP (ref 0–0.9)
INR BLD: 1.08 RATIO — SIGNIFICANT CHANGE UP (ref 0.85–1.16)
LYMPHOCYTES # BLD AUTO: 27.4 % — SIGNIFICANT CHANGE UP (ref 13–44)
LYMPHOCYTES # BLD AUTO: 3.32 K/UL — HIGH (ref 1–3.3)
MAGNESIUM SERPL-MCNC: 2.2 MG/DL — SIGNIFICANT CHANGE UP (ref 1.6–2.6)
MCHC RBC-ENTMCNC: 30.7 PG — SIGNIFICANT CHANGE UP (ref 27–34)
MCHC RBC-ENTMCNC: 33.6 GM/DL — SIGNIFICANT CHANGE UP (ref 32–36)
MCV RBC AUTO: 91.5 FL — SIGNIFICANT CHANGE UP (ref 80–100)
MONOCYTES # BLD AUTO: 0.83 K/UL — SIGNIFICANT CHANGE UP (ref 0–0.9)
MONOCYTES NFR BLD AUTO: 6.8 % — SIGNIFICANT CHANGE UP (ref 2–14)
NEUTROPHILS # BLD AUTO: 7.82 K/UL — HIGH (ref 1.8–7.4)
NEUTROPHILS NFR BLD AUTO: 64.6 % — SIGNIFICANT CHANGE UP (ref 43–77)
NRBC # BLD: 0 /100 WBCS — SIGNIFICANT CHANGE UP (ref 0–0)
NT-PROBNP SERPL-SCNC: 61 PG/ML — SIGNIFICANT CHANGE UP (ref 0–300)
PLATELET # BLD AUTO: 262 K/UL — SIGNIFICANT CHANGE UP (ref 150–400)
POTASSIUM SERPL-MCNC: 4.1 MMOL/L — SIGNIFICANT CHANGE UP (ref 3.5–5.3)
POTASSIUM SERPL-SCNC: 4.1 MMOL/L — SIGNIFICANT CHANGE UP (ref 3.5–5.3)
PROT SERPL-MCNC: 7.6 G/DL — SIGNIFICANT CHANGE UP (ref 6–8.3)
PROTHROM AB SERPL-ACNC: 12.3 SEC — SIGNIFICANT CHANGE UP (ref 9.9–13.4)
RBC # BLD: 4.69 M/UL — SIGNIFICANT CHANGE UP (ref 4.2–5.8)
RBC # FLD: 12.6 % — SIGNIFICANT CHANGE UP (ref 10.3–14.5)
SODIUM SERPL-SCNC: 139 MMOL/L — SIGNIFICANT CHANGE UP (ref 135–145)
TROPONIN T, HIGH SENSITIVITY RESULT: <6 NG/L — SIGNIFICANT CHANGE UP (ref 0–51)
TROPONIN T, HIGH SENSITIVITY RESULT: <6 NG/L — SIGNIFICANT CHANGE UP (ref 0–51)
WBC # BLD: 12.12 K/UL — HIGH (ref 3.8–10.5)
WBC # FLD AUTO: 12.12 K/UL — HIGH (ref 3.8–10.5)

## 2024-09-24 PROCEDURE — 93000 ELECTROCARDIOGRAM COMPLETE: CPT

## 2024-09-24 PROCEDURE — 99396 PREV VISIT EST AGE 40-64: CPT

## 2024-09-24 PROCEDURE — 99285 EMERGENCY DEPT VISIT HI MDM: CPT

## 2024-09-24 PROCEDURE — 70450 CT HEAD/BRAIN W/O DYE: CPT | Mod: 26,MC

## 2024-09-24 PROCEDURE — 71046 X-RAY EXAM CHEST 2 VIEWS: CPT | Mod: 26

## 2024-09-24 RX ORDER — ASPIRIN 325 MG
325 TABLET ORAL ONCE
Refills: 0 | Status: COMPLETED | OUTPATIENT
Start: 2024-09-24 | End: 2024-09-24

## 2024-09-24 RX ORDER — ACETAMINOPHEN 325 MG
975 TABLET ORAL ONCE
Refills: 0 | Status: COMPLETED | OUTPATIENT
Start: 2024-09-24 | End: 2024-09-24

## 2024-09-24 RX ADMIN — Medication 1 MILLIGRAM(S): at 23:26

## 2024-09-24 RX ADMIN — Medication 975 MILLIGRAM(S): at 18:31

## 2024-09-24 RX ADMIN — Medication 325 MILLIGRAM(S): at 21:52

## 2024-09-24 NOTE — ED ADULT NURSE NOTE - OBJECTIVE STATEMENT
58 y/o male PMH HTN, GERD, past ETOH abuse (sober 10 years) presents to ED c/o chest pain x few days to weeks. a Pt states he is feeling L sided chest pressure/heaviness that radiates to L shoulder. Pt states symptoms are intermittent, though worsen with exertion. Also feels SOB with exertion Pt also reports 5 days ago having syncopal episode at home while in the shower and hit the R side of his head against the shower wall - states he has had residual R posterior head pain. Pt is A&O x 4. Breathing even and unlabored. Gross motor and neuro intact. NSR on CM. Safety and comfort provided. Call bell within reach.

## 2024-09-24 NOTE — ED PROVIDER NOTE - RAPID ASSESSMENT
59 M PMHx of   [phone]  Patient was seen as a rapid assessment (QPA) patient. This is an incomplete workup and it is intended that the patient will be seen in the main emergency department. The patient will be seen and further worked up in the main emergency department and their care will be completed by the main emergency department team along with a thorough physical exam. Receiving team will follow up on labs, analgesia, any clinical imaging, reassess and disposition as clinically indicated, all decisions regarding the progression of care will be made at their discretion. - Prakash Rebolledo PA-C 59-year-old male PMH of?  HTN, GERD, EtOH abuse presents to the ED complaining of chest pains for the past several days to weeks.  Patient reports chest pain is a left-sided chest pressure and heaviness going to the left shoulder, patient reports symptoms occur intermittently but do seem to have an exertional component and associated with shortness of breath.  Patient reports is otherwise active walks several thousand steps per day, however recently has decreased exercise tolerance due to this pain which has been progressively worsening.  Patient was seen by his PCP and referred to the ED for further evaluation patient took 81 mg aspirin this morning.  Patient also reports approximately 5 days ago he felt the chest pain and had a syncopal episode while in the shower hit the right side of his head against the shower wall and reports since then he has noticed a mild right-sided facial droop.  Patient also reports mild right posterior head  [phone]  Patient was seen as a rapid assessment (QPA) patient. This is an incomplete workup and it is intended that the patient will be seen in the main emergency department. The patient will be seen and further worked up in the main emergency department and their care will be completed by the main emergency department team along with a thorough physical exam. Receiving team will follow up on labs, analgesia, any clinical imaging, reassess and disposition as clinically indicated, all decisions regarding the progression of care will be made at their discretion. - Prakash Rebolledo PA-C

## 2024-09-24 NOTE — HEALTH RISK ASSESSMENT
[Fair] :  ~his/her~ mood as fair [No] : In the past 12 months have you used drugs other than those required for medical reasons? No [No falls in past year] : Patient reported no falls in the past year [0] : 2) Feeling down, depressed, or hopeless: Not at all (0) [PHQ-2 Negative - No further assessment needed] : PHQ-2 Negative - No further assessment needed [Never] : Never [NO] : No [With Family] : lives with family [Employed] : employed [] :  [# Of Children ___] : has [unfilled] children [Sexually Active] : sexually active [Feels Safe at Home] : Feels safe at home [Fully functional (bathing, dressing, toileting, transferring, walking, feeding)] : Fully functional (bathing, dressing, toileting, transferring, walking, feeding) [Fully functional (using the telephone, shopping, preparing meals, housekeeping, doing laundry, using] : Fully functional and needs no help or supervision to perform IADLs (using the telephone, shopping, preparing meals, housekeeping, doing laundry, using transportation, managing medications and managing finances) [Reports normal functional visual acuity (ie: able to read med bottle)] : Reports normal functional visual acuity [de-identified] : sober x 10 years [Audit-CScore] : 0 [de-identified] : active [de-identified] : tries to be healthy [GLD5Mmhrw] : 0 [High Risk Behavior] : no high risk behavior [Reports changes in hearing] : Reports no changes in hearing [Reports changes in vision] : Reports no changes in vision [ColonoscopyComments] : cologuard referral [FreeTextEntry2] : construction

## 2024-09-24 NOTE — HISTORY OF PRESENT ILLNESS
[FreeTextEntry1] : 60 yo male with PMHx HTN, HLD, alcohol abuse (sober x 10 years)  presents to the office for a physical.  [de-identified] : the patient reports over the past week experiencing intermittent chest pain/tightness, left sided, especially during physical activity. reports normally has no symptoms, recently has been experiencing "my heart pounding out of my chest, and chest pressure" after walking up the stairs. reports symptoms resolve on their own after a couple of minutes. in addition, has been experiencing dizziness x 3 months - intermittent blurry vision, worse over the past two weeks. reports almost passing out in the shower friday, hit his head against the shower wall. similar episode happened when he stood up too quickly. also feels that he's been "drifting to the right' if he closes his eyes when ambulating. has right eye ptosis, states by the end of the day he cannot open his eye. denies any slurred speech, right side 'feels a little weaker'

## 2024-09-24 NOTE — REVIEW OF SYSTEMS
[Fever] : no fever [Chills] : no chills [Fatigue] : fatigue [Hot Flashes] : no hot flashes [Night Sweats] : no night sweats [Recent Change In Weight] : ~T no recent weight change [Discharge] : no discharge [Pain] : no pain [Redness] : no redness [Dryness] : no dryness [Vision Problems] : vision problems [Itching] : no itching [Chest Pain] : chest pain [Palpitations] : palpitations [Claudication] : no  leg claudication [Lower Ext Edema] : no lower extremity edema [Orthopena] : no orthopnea [Paroxysmal Nocturnal Dyspnea] : no paroxysmal nocturnal dyspnea [Shortness Of Breath] : shortness of breath [Wheezing] : no wheezing [Cough] : no cough [Dyspnea on Exertion] : dyspnea on exertion [Joint Pain] : joint pain [Joint Stiffness] : joint stiffness [Muscle Pain] : muscle pain [Muscle Weakness] : muscle weakness [Back Pain] : back pain [Headache] : headache [Dizziness] : dizziness [Fainting] : fainting [Confusion] : no confusion [Unsteady Walk] : ataxia [Memory Loss] : no memory loss [Negative] : Heme/Lymph [FreeTextEntry3] : right eyelid droop, worsening [FreeTextEntry9] : right hip

## 2024-09-24 NOTE — ED PROVIDER NOTE - ATTENDING APP SHARED VISIT CONTRIBUTION OF CARE
Patient is a 59-year-old male history of alcohol abuse has not drank in about 4 years hypertension no smoking history had a cath with 30% blockages about 6 7 years ago at [facility] does not follow-up with cardiology.  Patient complaining of dyspnea with exertion for the last several weeks or so now.  Lightheadedness intermittent chest pains.  Patient asymptomatic while being evaluated vital signs are stable EKG is unremarkable except for Q waves in septal lead cardiac workup was unremarkable offered patient to be admitted for chest pain workup.

## 2024-09-24 NOTE — PHYSICAL EXAM
[No Acute Distress] : no acute distress [Normal Voice/Communication] : normal voice/communication [Normal Sclera/Conjunctiva] : normal sclera/conjunctiva [PERRL] : pupils equal round and reactive to light [No Respiratory Distress] : no respiratory distress  [No Accessory Muscle Use] : no accessory muscle use [Normal Rate] : normal rate  [Regular Rhythm] : with a regular rhythm [Normal S1, S2] : normal S1 and S2 [No Carotid Bruits] : no carotid bruits [No Edema] : there was no peripheral edema [Declined Rectal Exam] : declined rectal exam [No CVA Tenderness] : no CVA  tenderness [No Spinal Tenderness] : no spinal tenderness [No Rash] : no rash [Coordination Grossly Intact] : coordination grossly intact [No Focal Deficits] : no focal deficits [Speech Grossly Normal] : speech grossly normal [Alert and Oriented x3] : oriented to person, place, and time [Normal Mood] : the mood was normal [Normal] : affect was normal and insight and judgment were intact [de-identified] : wheezing to ULICES  [de-identified] : murmur [de-identified] : drifting to the right

## 2024-09-24 NOTE — ED ADULT NURSE NOTE - NSFALLRISKINTERV_ED_ALL_ED

## 2024-09-24 NOTE — ED PROVIDER NOTE - PHYSICAL EXAMINATION
CONSTITUTIONAL: Patient is awake, alert and oriented x 3. Patient is well appearing and in no acute distress.  HEAD: NCAT  EYES: PERRL bilaterally,   ENT: Airway patent, Nasal mucosa clear.   NECK: Supple,   LUNGS: CTA B/L,   HEART: RRR.+S1S2  ABDOMEN: Soft, non-tender to palpation throughout all four quadrants,   MSK: No edema or calf tenderness b/l, FROM upper and lower ext b/l,   SKIN: No rash or lesions  NEURO: No focal deficits,

## 2024-09-24 NOTE — ED ADULT TRIAGE NOTE - CHIEF COMPLAINT QUOTE
pt has had 1 week of chest pain and has passed out in Friday in the shower  and went to the PMD and sent here  pt has had droopy eye or over a 1 or two

## 2024-09-24 NOTE — ED PROVIDER NOTE - OBJECTIVE STATEMENT
60 y/o male on no daily meds, presents to the ED complaining of chest pain x weeks. 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 States that he had a syncopal episode recently in the shower where he hit his head. Did not seek medical attention at that time. Had a cardiac eval about 8 years ago which included angiogarm. He did not have any stents placed at that time. States that today saw his PCP and was sent to the ED for cardiac eval. Denies any headache, fever, chills, cough, n/v/d.

## 2024-09-25 ENCOUNTER — TRANSCRIPTION ENCOUNTER (OUTPATIENT)
Age: 59
End: 2024-09-25

## 2024-09-25 ENCOUNTER — RESULT REVIEW (OUTPATIENT)
Age: 59
End: 2024-09-25

## 2024-09-25 VITALS
TEMPERATURE: 98 F | OXYGEN SATURATION: 96 % | HEART RATE: 58 BPM | SYSTOLIC BLOOD PRESSURE: 156 MMHG | RESPIRATION RATE: 18 BRPM | DIASTOLIC BLOOD PRESSURE: 94 MMHG

## 2024-09-25 DIAGNOSIS — I10 ESSENTIAL (PRIMARY) HYPERTENSION: ICD-10-CM

## 2024-09-25 DIAGNOSIS — R55 SYNCOPE AND COLLAPSE: ICD-10-CM

## 2024-09-25 DIAGNOSIS — H02.401 UNSPECIFIED PTOSIS OF RIGHT EYELID: ICD-10-CM

## 2024-09-25 DIAGNOSIS — I20.0 UNSTABLE ANGINA: ICD-10-CM

## 2024-09-25 LAB
A1C WITH ESTIMATED AVERAGE GLUCOSE RESULT: 5.6 % — SIGNIFICANT CHANGE UP (ref 4–5.6)
ALBUMIN SERPL ELPH-MCNC: 4.1 G/DL — SIGNIFICANT CHANGE UP (ref 3.3–5)
ALP SERPL-CCNC: 87 U/L — SIGNIFICANT CHANGE UP (ref 40–120)
ALT FLD-CCNC: 13 U/L — SIGNIFICANT CHANGE UP (ref 10–45)
ANION GAP SERPL CALC-SCNC: 11 MMOL/L — SIGNIFICANT CHANGE UP (ref 5–17)
AST SERPL-CCNC: 17 U/L — SIGNIFICANT CHANGE UP (ref 10–40)
BASOPHILS # BLD AUTO: 0.03 K/UL — SIGNIFICANT CHANGE UP (ref 0–0.2)
BASOPHILS NFR BLD AUTO: 0.5 % — SIGNIFICANT CHANGE UP (ref 0–2)
BILIRUB SERPL-MCNC: 1.4 MG/DL — HIGH (ref 0.2–1.2)
BUN SERPL-MCNC: 13 MG/DL — SIGNIFICANT CHANGE UP (ref 7–23)
CALCIUM SERPL-MCNC: 9.2 MG/DL — SIGNIFICANT CHANGE UP (ref 8.4–10.5)
CHLORIDE SERPL-SCNC: 106 MMOL/L — SIGNIFICANT CHANGE UP (ref 96–108)
CO2 SERPL-SCNC: 23 MMOL/L — SIGNIFICANT CHANGE UP (ref 22–31)
CREAT SERPL-MCNC: 0.59 MG/DL — SIGNIFICANT CHANGE UP (ref 0.5–1.3)
CRP SERPL-MCNC: <3 MG/L — SIGNIFICANT CHANGE UP (ref 0–4)
EGFR: 110 ML/MIN/1.73M2 — SIGNIFICANT CHANGE UP
EOSINOPHIL # BLD AUTO: 0.15 K/UL — SIGNIFICANT CHANGE UP (ref 0–0.5)
EOSINOPHIL NFR BLD AUTO: 2.3 % — SIGNIFICANT CHANGE UP (ref 0–6)
ERYTHROCYTE [SEDIMENTATION RATE] IN BLOOD: 11 MM/HR — SIGNIFICANT CHANGE UP (ref 0–20)
ESTIMATED AVERAGE GLUCOSE: 114 MG/DL — SIGNIFICANT CHANGE UP (ref 68–114)
GLUCOSE SERPL-MCNC: 109 MG/DL — HIGH (ref 70–99)
HCT VFR BLD CALC: 41.5 % — SIGNIFICANT CHANGE UP (ref 39–50)
HGB BLD-MCNC: 13.9 G/DL — SIGNIFICANT CHANGE UP (ref 13–17)
IMM GRANULOCYTES NFR BLD AUTO: 0.3 % — SIGNIFICANT CHANGE UP (ref 0–0.9)
LYMPHOCYTES # BLD AUTO: 2.24 K/UL — SIGNIFICANT CHANGE UP (ref 1–3.3)
LYMPHOCYTES # BLD AUTO: 34.4 % — SIGNIFICANT CHANGE UP (ref 13–44)
MAGNESIUM SERPL-MCNC: 2 MG/DL — SIGNIFICANT CHANGE UP (ref 1.6–2.6)
MCHC RBC-ENTMCNC: 30.2 PG — SIGNIFICANT CHANGE UP (ref 27–34)
MCHC RBC-ENTMCNC: 33.5 GM/DL — SIGNIFICANT CHANGE UP (ref 32–36)
MCV RBC AUTO: 90 FL — SIGNIFICANT CHANGE UP (ref 80–100)
MONOCYTES # BLD AUTO: 0.52 K/UL — SIGNIFICANT CHANGE UP (ref 0–0.9)
MONOCYTES NFR BLD AUTO: 8 % — SIGNIFICANT CHANGE UP (ref 2–14)
NEUTROPHILS # BLD AUTO: 3.56 K/UL — SIGNIFICANT CHANGE UP (ref 1.8–7.4)
NEUTROPHILS NFR BLD AUTO: 54.5 % — SIGNIFICANT CHANGE UP (ref 43–77)
NRBC # BLD: 0 /100 WBCS — SIGNIFICANT CHANGE UP (ref 0–0)
PHOSPHATE SERPL-MCNC: 3.1 MG/DL — SIGNIFICANT CHANGE UP (ref 2.5–4.5)
PLATELET # BLD AUTO: 241 K/UL — SIGNIFICANT CHANGE UP (ref 150–400)
POTASSIUM SERPL-MCNC: 3.9 MMOL/L — SIGNIFICANT CHANGE UP (ref 3.5–5.3)
POTASSIUM SERPL-SCNC: 3.9 MMOL/L — SIGNIFICANT CHANGE UP (ref 3.5–5.3)
PROT SERPL-MCNC: 6.7 G/DL — SIGNIFICANT CHANGE UP (ref 6–8.3)
RBC # BLD: 4.61 M/UL — SIGNIFICANT CHANGE UP (ref 4.2–5.8)
RBC # FLD: 12.7 % — SIGNIFICANT CHANGE UP (ref 10.3–14.5)
SODIUM SERPL-SCNC: 140 MMOL/L — SIGNIFICANT CHANGE UP (ref 135–145)
TROPONIN T, HIGH SENSITIVITY RESULT: <6 NG/L — SIGNIFICANT CHANGE UP (ref 0–51)
TSH SERPL-MCNC: 2.68 UIU/ML — SIGNIFICANT CHANGE UP (ref 0.27–4.2)
WBC # BLD: 6.52 K/UL — SIGNIFICANT CHANGE UP (ref 3.8–10.5)
WBC # FLD AUTO: 6.52 K/UL — SIGNIFICANT CHANGE UP (ref 3.8–10.5)

## 2024-09-25 PROCEDURE — 82947 ASSAY GLUCOSE BLOOD QUANT: CPT

## 2024-09-25 PROCEDURE — 84132 ASSAY OF SERUM POTASSIUM: CPT

## 2024-09-25 PROCEDURE — 86140 C-REACTIVE PROTEIN: CPT

## 2024-09-25 PROCEDURE — 85014 HEMATOCRIT: CPT

## 2024-09-25 PROCEDURE — 85610 PROTHROMBIN TIME: CPT

## 2024-09-25 PROCEDURE — 85652 RBC SED RATE AUTOMATED: CPT

## 2024-09-25 PROCEDURE — 80053 COMPREHEN METABOLIC PANEL: CPT

## 2024-09-25 PROCEDURE — 93306 TTE W/DOPPLER COMPLETE: CPT

## 2024-09-25 PROCEDURE — 71260 CT THORAX DX C+: CPT | Mod: 26,59

## 2024-09-25 PROCEDURE — 85379 FIBRIN DEGRADATION QUANT: CPT

## 2024-09-25 PROCEDURE — 83880 ASSAY OF NATRIURETIC PEPTIDE: CPT

## 2024-09-25 PROCEDURE — 84484 ASSAY OF TROPONIN QUANT: CPT

## 2024-09-25 PROCEDURE — 84100 ASSAY OF PHOSPHORUS: CPT

## 2024-09-25 PROCEDURE — 70450 CT HEAD/BRAIN W/O DYE: CPT | Mod: MC

## 2024-09-25 PROCEDURE — 86041 ACETYLCHOLN RCPTR BNDNG ANTB: CPT

## 2024-09-25 PROCEDURE — A9585: CPT

## 2024-09-25 PROCEDURE — 99285 EMERGENCY DEPT VISIT HI MDM: CPT

## 2024-09-25 PROCEDURE — 75574 CT ANGIO HRT W/3D IMAGE: CPT | Mod: 26

## 2024-09-25 PROCEDURE — 85730 THROMBOPLASTIN TIME PARTIAL: CPT

## 2024-09-25 PROCEDURE — 70553 MRI BRAIN STEM W/O & W/DYE: CPT | Mod: 26

## 2024-09-25 PROCEDURE — 71260 CT THORAX DX C+: CPT | Mod: MC

## 2024-09-25 PROCEDURE — 86596 VOLTAGE-GTD CA CHNL ANTB EA: CPT

## 2024-09-25 PROCEDURE — 85025 COMPLETE CBC W/AUTO DIFF WBC: CPT

## 2024-09-25 PROCEDURE — 84295 ASSAY OF SERUM SODIUM: CPT

## 2024-09-25 PROCEDURE — 99223 1ST HOSP IP/OBS HIGH 75: CPT

## 2024-09-25 PROCEDURE — 82803 BLOOD GASES ANY COMBINATION: CPT

## 2024-09-25 PROCEDURE — 82330 ASSAY OF CALCIUM: CPT

## 2024-09-25 PROCEDURE — 82435 ASSAY OF BLOOD CHLORIDE: CPT

## 2024-09-25 PROCEDURE — 93306 TTE W/DOPPLER COMPLETE: CPT | Mod: 26

## 2024-09-25 PROCEDURE — 85018 HEMOGLOBIN: CPT

## 2024-09-25 PROCEDURE — 83735 ASSAY OF MAGNESIUM: CPT

## 2024-09-25 PROCEDURE — 99223 1ST HOSP IP/OBS HIGH 75: CPT | Mod: GC

## 2024-09-25 PROCEDURE — 83519 RIA NONANTIBODY: CPT

## 2024-09-25 PROCEDURE — 70553 MRI BRAIN STEM W/O & W/DYE: CPT | Mod: MC

## 2024-09-25 PROCEDURE — 83036 HEMOGLOBIN GLYCOSYLATED A1C: CPT

## 2024-09-25 PROCEDURE — 83605 ASSAY OF LACTIC ACID: CPT

## 2024-09-25 PROCEDURE — 71046 X-RAY EXAM CHEST 2 VIEWS: CPT

## 2024-09-25 PROCEDURE — 75574 CT ANGIO HRT W/3D IMAGE: CPT | Mod: MC

## 2024-09-25 PROCEDURE — 84443 ASSAY THYROID STIM HORMONE: CPT

## 2024-09-25 RX ORDER — ASPIRIN 325 MG
1 TABLET ORAL
Refills: 0 | DISCHARGE

## 2024-09-25 RX ORDER — ACETAMINOPHEN 325 MG
2 TABLET ORAL
Refills: 0 | DISCHARGE

## 2024-09-25 RX ORDER — 5-HYDROXYTRYPTOPHAN (5-HTP) 100 MG
3 TABLET,DISINTEGRATING ORAL ONCE
Refills: 0 | Status: COMPLETED | OUTPATIENT
Start: 2024-09-25 | End: 2024-09-25

## 2024-09-25 RX ORDER — LABETALOL HYDROCHLORIDE 200 MG/1
1 TABLET, FILM COATED ORAL
Qty: 0 | Refills: 0 | DISCHARGE
Start: 2024-09-25

## 2024-09-25 RX ORDER — LABETALOL HYDROCHLORIDE 200 MG/1
100 TABLET, FILM COATED ORAL EVERY 8 HOURS
Refills: 0 | Status: DISCONTINUED | OUTPATIENT
Start: 2024-09-25 | End: 2024-09-25

## 2024-09-25 RX ORDER — MELOXICAM 7.5 MG/1
1 TABLET ORAL
Refills: 0 | DISCHARGE

## 2024-09-25 RX ORDER — SODIUM CHLORIDE 0.9 % (FLUSH) 0.9 %
1000 SYRINGE (ML) INJECTION ONCE
Refills: 0 | Status: COMPLETED | OUTPATIENT
Start: 2024-09-25 | End: 2024-09-25

## 2024-09-25 RX ADMIN — Medication 3 MILLIGRAM(S): at 02:02

## 2024-09-25 RX ADMIN — Medication 500 MILLILITER(S): at 15:23

## 2024-09-25 NOTE — DISCHARGE NOTE PROVIDER - CARE PROVIDERS DIRECT ADDRESSES
,[email or web address]

## 2024-09-25 NOTE — H&P ADULT - NSHPREVIEWOFSYSTEMS_GEN_ALL_CORE
REVIEW OF SYSTEMS:  CONSTITUTIONAL: No weakness. No fevers. No chills. No rigors. No poor appetite.  EYES: No blurry or double vision. No eye pain. +right eyelid ptosis  ENT: No hearing difficulty. No sore throat. No Sinusitis/rhinorrhea.   NECK: No pain. No stiffness/rigidity.  CARDIAC: +chest pain. +palpitations. +lightheadedness. +syncope.  RESPIRATORY: +post nasal cough. No SOB.   GASTROINTESTINAL: No abdominal pain. No nausea. No vomiting. No diarrhea. No constipation.   GENITOURINARY: No dysuria. No frequency. No oliguria.  NEUROLOGICAL: No numbness/tingling. No focal weakness. No headache. No unsteady gait.  BACK: No back pain. No flank pain.  EXTREMITIES: No lower extremity edema. Full ROM. No joint pain.  SKIN: No rashes. No itching. No other lesions.    All other review of systems is negative unless indicated above.  Unless indicated above, unable to assess ROS 2/2 REVIEW OF SYSTEMS:  CONSTITUTIONAL: No weakness. No fevers. No chills. No rigors. No poor appetite.  EYES: No blurry or double vision. No eye pain. +right eyelid ptosis  ENT: No hearing difficulty. No sore throat. No Sinusitis/rhinorrhea.   NECK: No pain. No stiffness/rigidity.  CARDIAC: +chest pain. +palpitations. +lightheadedness. +syncope.  RESPIRATORY: +post nasal cough. No SOB.   GASTROINTESTINAL: No abdominal pain. No nausea. No vomiting. No diarrhea. No constipation.   GENITOURINARY: No dysuria. No frequency. No oliguria.  NEUROLOGICAL: No numbness/tingling. No focal weakness. No headache. No unsteady gait.  BACK: No back pain. No flank pain.  EXTREMITIES: No lower extremity edema. Full ROM. +chronic joint pain.  SKIN: No rashes. No itching. No other lesions.    All other review of systems is negative unless indicated above.  Unless indicated above, unable to assess ROS 2/2

## 2024-09-25 NOTE — CHART NOTE - NSCHARTNOTEFT_GEN_A_CORE
Medicine attending second touch note    Pt seen this morning in [place] ED holding area. No acute distress, RRR, no lower extremity edema, breathing comfortably on room air. +ptosis on the right eye. EMOI intact but pt endorsed double-vision when gazing to the left > right.     EKG reviewed. NSR with Q waves in leads V1, V2.   Troponins negative x3.  TTE normal: no wall motion abnormality, no valvulopathy, no heart failure.     - CT chest C+ to assess for thymoma  - CT coronaries  - outpatient neuro follow up  - appreciate neuro recommendations    Rest of plan as per admission H&P. Medicine attending second touch note    Pt seen this morning in [place] ED holding area. No acute distress, RRR, no lower extremity edema, breathing comfortably on room air. +ptosis on the right eye. EMOI intact but pt endorsed double-vision when gazing to the left > right.     EKG reviewed. NSR with Q waves in leads V1, V2.   Troponins negative x3.  TTE normal: no wall motion abnormality, no valvulopathy, no heart failure.     - CT chest C+ to assess for thymoma  - CT coronaries  - outpatient neuro follow up  - appreciate neuro recommendations    Rest of plan as per admission H&P.  -------------  TTE and CT chest shows 4.2 cm mid ascending aorta. CT surg consulted Medicine attending second touch note    Pt seen this morning in [place] ED holding area. No acute distress, RRR, no lower extremity edema, breathing comfortably on room air. +ptosis on the right eye. EMOI intact but pt endorsed double-vision when gazing to the left > right.     EKG reviewed. NSR with Q waves in leads V1, V2.   Troponins negative x3.  TTE normal: no wall motion abnormality, no valvulopathy, no heart failure.     - CT chest C+ to assess for thymoma  - CT coronaries  - outpatient neuro follow up  - appreciate neuro recommendations    Rest of plan as per admission H&P.  -------------  TTE and CT chest shows 4.2 cm mid ascending aorta, no dissection. D/w CT surgery, likely not the cause for the patient's symptoms.

## 2024-09-25 NOTE — CONSULT NOTE ADULT - SUBJECTIVE AND OBJECTIVE BOX
Neurology - Consult Note    -  Spectra: [phone] ([facility]), [phone] ([facility])  -    HPI: Patient JACKELIN CHAUHAN is a 59y (1965) man with a PMHx significant for HTN not on any medications presenting to the hospital for chest pain, SOB, syncope, and R eye ptosis.   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 Primary concern that brought patient to hospital was left sided chest pain w radiation to left shoulder and L side of neck. Patient reportedly experiences soreness in L chest at rest and the pain is worsened w exertion, associated with SOB, and improves with rest. During exertional chest pain episodes, pt feels intermittent palpitations like his heart is breathing heavily but sometimes has the sensation that his heart w stop for a moment. When he has the heart stopping sensation he will feel lightheaded. Pt reportedly passed out one time 6 days ago. He reported to medicine that he had a stress test and echo many years ago. Patient noted to have R eyelid ptosis for which neurology was consulted.    R eyelid ptosis: onset a few months ago, described as eyelid drooping to the point where patient can't see and has to manually open his eyelid. The ptosis is not associated with any eye moving difficulties, visual changes, weakness, paresthesia, or speech changes          Review of Systems:  INCOMPLETE   CONSTITUTIONAL: No fevers or chills  EYES AND ENT: No visual changes or no throat pain   NECK: No pain or stiffness  RESPIRATORY: No hemoptysis or shortness of breath  CARDIOVASCULAR: No chest pain or palpitations  GASTROINTESTINAL: No melena or hematochezia  GENITOURINARY: No dysuria or hematuria  NEUROLOGICAL: +As stated in HPI above  SKIN: No itching, burning, rashes, or lesions   All other review of systems is negative unless indicated above.    Allergies:  No Known Allergies      PMHx/PSHx/Family Hx: As above, otherwise see below   GERD (gastroesophageal reflux disease)    Hypertension    Alcohol abuse    Pancreatitis, alcoholic        Social Hx:  No current use of tobacco, alcohol, or illicit drugs  Lives with ***    Medications:  MEDICATIONS  (STANDING):    MEDICATIONS  (PRN):  labetalol 100 milliGRAM(s) Oral every 8 hours PRN SBP >160      Vitals:  T(C): 36.4 (09-25-24 @ 01:26), Max: 37.2 (09-24-24 @ 14:06)  HR: 59 (09-25-24 @ 01:26) (59 - 86)  BP: 132/67 (09-25-24 @ 01:26) (132/67 - 198/98)  RR: 20 (09-25-24 @ 01:26) (18 - 20)  SpO2: 96% (09-25-24 @ 01:26) (96% - 100%)    Physical Examination: INCOMPLETE  General - NAD  Cardiovascular - Peripheral pulses palpable, no edema  Eyes - Fundoscopy with flat, sharp optic discs and no hemorrhage or exudates; Fundoscopy not well visualized; Fundoscopy not performed due to safety precautions in the setting of the COVID-19 pandemic    Neurologic Exam:  Mental status - Awake, Alert, Oriented to person, place, and time. Speech fluent, repetition and naming intact. Follows simple and complex commands. Attention/concentration, recent and remote memory (including registration and recall), and fund of knowledge intact    Cranial nerves - PERRLA, VFF, EOMI, face sensation (V1-V3) intact b/l, facial strength intact without asymmetry b/l, hearing intact b/l, palate with symmetric elevation, trapezius OR sternocleidomastiod 5/5 strength b/l, tongue midline on protrusion with full lateral movement    Motor - Normal bulk and tone throughout. No pronator drift.  Strength testing            Deltoid      Biceps      Triceps     Wrist Extension    Wrist Flexion     Interossei         R            5                 5               5                     5                              5                        5                 5  L             5                 5               5                     5                              5                        5                 5              Hip Flexion    Hip Extension    Knee Flexion    Knee Extension    Dorsiflexion    Plantar Flexion  R              5                           5                       5                           5                            5                          5  L              5                           5                        5                           5                            5                          5    Sensation - Light touch/temperature OR pain/vibration intact throughout    DTR's -             Biceps      Triceps     Brachioradialis      Patellar    Ankle    Toes/plantar response  R             2+             2+                  2+                       2+            2+                 Down  L              2+             2+                 2+                        2+           2+                 Down    Coordination - Finger to Nose intact b/l. No tremors appreciated    Gait and station - Normal casual gait. Romberg (-)    Labs:                        14.4   12.12 )-----------( 262      ( 24 Sep 2024 18:41 )             42.9     09-24    139  |  101  |  14  ----------------------------<  91  4.1   |  24  |  0.67    Ca    9.8      24 Sep 2024 18:41  Mg     2.2     09-24    TPro  7.6  /  Alb  4.8  /  TBili  1.5[H]  /  DBili  x   /  AST  18  /  ALT  17  /  AlkPhos  93  09-24    CAPILLARY BLOOD GLUCOSE        LIVER FUNCTIONS - ( 24 Sep 2024 18:41 )  Alb: 4.8 g/dL / Pro: 7.6 g/dL / ALK PHOS: 93 U/L / ALT: 17 U/L / AST: 18 U/L / GGT: x             PT/INR - ( 24 Sep 2024 18:41 )   PT: 12.3 sec;   INR: 1.08 ratio         PTT - ( 24 Sep 2024 18:41 )  PTT:32.7 sec  CSF:                  Radiology:  CT Head No Cont:  (24 Sep 2024 20:37)     Neurology - Consult Note    -  Spectra: [phone] ([facility]), [phone] ([facility])  -    HPI: Patient JACKELIN CHAUHAN is a 59y R handed (1965) man with a PMHx significant for HTN not on any medications presenting to the hospital for chest pain, SOB, syncope, and R eye ptosis.   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 Primary concern that brought patient to hospital was left sided chest pain w radiation to left shoulder and L side of neck. Patient reportedly experiences soreness in L chest at rest and the pain is worsened w exertion, associated with SOB, and improves with rest. During exertional chest pain episodes, pt feels intermittent palpitations like his heart is breathing heavily but sometimes has the sensation that his heart w stop for a moment. When he has the heart stopping sensation he will feel lightheaded. Pt reportedly passed out one time 6 days ago. He reported to medicine that he had a stress test and echo many years ago. Patient noted to have R eyelid ptosis for which neurology was consulted.    R eyelid ptosis: onset a few months ago, described as eyelid drooping to the point where patient can't see and has to manually open his eyelid. The ptosis is not associated with any eye moving difficulties or speech changes. Patient reported that the ptosis started at the same time as his chest pain. He feels a general sense of weakness on his R side despite being R handed. He also notices blurry and double vision when reading, looking at his phone, watching tv, or looking at his car dashboard. He thinks he notices the vision changes more when tired but reported that he notices the eye problems at anytime of day even when he has just woken up. Patient has NO pain with eye movement or facial muscle contraction  He reported chronic arthritic pain associated w dull sensation in R forehead, RUE, and RLE compared to L though denied any history of stroke      Review of Systems:    CONSTITUTIONAL: No fevers or chills  EYES AND ENT: above  NECK: chronic unchanged neck pain and stiffness he attributes to arthritis  RESPIRATORY: above  CARDIOVASCULAR: above  GASTROINTESTINAL: No melena or hematochezia  GENITOURINARY: No dysuria or hematuria  NEUROLOGICAL: +As stated in HPI above  SKIN: No itching, burning, rashes, or lesions   All other review of systems is negative unless indicated above.    Allergies:  No Known Allergies      PMHx/PSHx/Family Hx: As above, otherwise see below   GERD (gastroesophageal reflux disease)  Hypertension  Alcohol abuse sober for ten years  Pancreatitis, alcoholic        Social Hx:  No current use of tobacco, alcohol, or illicit drugs    Medications:  MEDICATIONS  (STANDING):    MEDICATIONS  (PRN):  labetalol 100 milliGRAM(s) Oral every 8 hours PRN SBP >160      Vitals:  T(C): 36.4 (09-25-24 @ 01:26), Max: 37.2 (09-24-24 @ 14:06)  HR: 59 (09-25-24 @ 01:26) (59 - 86)  BP: 132/67 (09-25-24 @ 01:26) (132/67 - 198/98)  RR: 20 (09-25-24 @ 01:26) (18 - 20)  SpO2: 96% (09-25-24 @ 01:26) (96% - 100%)    Physical Examination:  General - NAD  HEENT: R eye ptosis with around 50% of eyeball covered by lid but able to open R eye without assistance from hands though ptosis will redemonstrate shortly after    Neurologic Exam:  Mental status - Awake, Alert, Oriented to person, place, and time. Speech fluent, repetition and naming intact. Follows simple and complex commands. Attention/concentration, recent and remote memory (including registration and recall), and fund of knowledge intact  Vision: blury vision in R lower visual field and L lower visual field; double vision in L upper visual field; non blurry non duplicated vision everywhere else    Cranial nerves - PERRLA, EOMI,diminished sensation to light touch in R forehead compared to L as described above, ptosis as above, hearing intact b/l, palate with symmetric elevation, trapezius OR sternocleidomastiod 5/5 strength b/l, tongue midline on protrusion with full lateral movement    Motor - Normal bulk and tone throughout. No pronator drift.  Strength testing: No muscle group was fatigable  On one breath, patient able to count to 39  Neck flexion and extension 5/5            Deltoid      Biceps      Triceps     Wrist Extension                       Wrist Flexion     Interossei         R            5                 5               5                     5                              5                        5                 5  L             5                 5               5                     5                              5                        5                 5  *of note when arms tests IN [facility] the RUE strength in pectoral and tricep was 4/5 and LUE was 5/5              Hip Flexion    Hip Extension               Knee Flexion    Knee Extension    Dorsiflexion    Plantar Flexion  R              5                           5                       5                           5                            5                          5  L              5                           5                        5                           5                            5                          5    Sensation - on light touch, dull sensation in R forehead, RUE, and RLE compared to L; sensation to light touch intact and symmetric in neck, thorax, abdomen down to ASIS    DTR's -             Biceps      Triceps     Brachioradialis                 Patellar    Ankle    Toes/plantar response  R             2+             2+                  2+                       2+            2+                 Down  L              2+             2+                 2+                        2+           2+                 Down    Coordination - Finger to Nose intact b/l.    Labs:                        14.4   12.12 )-----------( 262      ( 24 Sep 2024 18:41 )             42.9     09-24    139  |  101  |  14  ----------------------------<  91  4.1   |  24  |  0.67    Ca    9.8      24 Sep 2024 18:41  Mg     2.2     09-24    TPro  7.6  /  Alb  4.8  /  TBili  1.5[H]  /  DBili  x   /  AST  18  /  ALT  17  /  AlkPhos  93  09-24    CAPILLARY BLOOD GLUCOSE        LIVER FUNCTIONS - ( 24 Sep 2024 18:41 )  Alb: 4.8 g/dL / Pro: 7.6 g/dL / ALK PHOS: 93 U/L / ALT: 17 U/L / AST: 18 U/L / GGT: x             PT/INR - ( 24 Sep 2024 18:41 )   PT: 12.3 sec;   INR: 1.08 ratio         PTT - ( 24 Sep 2024 18:41 )  PTT:32.7 sec  CSF:        Radiology:  CT Head No Cont:  (24 Sep 2024 20:37)

## 2024-09-25 NOTE — DISCHARGE NOTE PROVIDER - HOSPITAL COURSE
Was called by RN due to patient wanting to sign out AMA. Asked patient why he wanted to leave, reports that he can go see his doctors outpatient for a stress test.    Patient is AAO x 3. I explained at length to the patient the risks of signing out AMA including but not limited to harm, injury or death. I explained the risks, benefits and alternatives to treatment as well as the attendant risks of refusing treatment at this time. I offered to answer any questions and fully answered any such questions. We believe that the patient fully understands what has been explained and answered. I informed hospitalist Dr. Granda of this, aware. Patient signed form to sign out AMA and accepts responsibility for any and all results of this decision.

## 2024-09-25 NOTE — DISCHARGE NOTE PROVIDER - PROVIDER TOKENS
PROVIDER:[TOKEN:[[id number]],FOLLOWUP:[1-3 days]] PROVIDER:[TOKEN:[[id number]],FOLLOWUP:[1-3 days]],PROVIDER:[TOKEN:[[id number]],FOLLOWUP:[2 weeks]]

## 2024-09-25 NOTE — CONSULT NOTE ADULT - ATTENDING COMMENTS
HPI as per resident note, personally verified by me. Patient with R eye ptosis for the past several months as well as intermittent binocular diplopia, worse with activity and at the end of the day and improved with rest and in the morning. He also reports intermittent right sided sensation decrease in face, arm, and leg. He presents with SOB and chest pain. Never had symptoms like this before. He denies any other focal neurologic deficits or abnormal movements.    Neurologic exam as per resident note with additions as below:  AAO x3, speech fluent  CN's II-XII intact with R eye mild ptosis, R eye with dec upgaze and accomodation resulting in diagonal (horizontal > vertical) binocular diplopia. (+) lid lag resulting in moderate R eye ptosis and worsening of diplopia and improvement in above symptoms with ice pack test. NE 5/5, NF 4+/5. Uses b/l hearing aides  Strength 5/5 all  Sens intact on L side and mild dec on entire R side  FtN intact b/l  Downgoing b/l plantar response    CRP WNL, ESR WNL    < from: MR Head w/wo IV Cont (09.25.24 @ 08:00) >    The ventricles and sulci are normal in size and configuration. There is   no intracranial mass, hemorrhage, or acute infarction.  There is no   extra-axial collection, mass effect, or midline shift. There is no   evidence of abnormal intracranial enhancement. 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    The major vessels at at the base of the brain follow their expected   course and contour. The dural venous sinuses are patent.    The orbits are unremarkable. The paranasal sinuses are clear. The mastoid   air cells are clear.    IMPRESSION:    No intracranial mass, hemorrhage, or acute infarction.    < end of copied text >      A/P:  R eye ptosis  Diplopia  HTN  Syncope  Skin sensation disturbance    - Etiology for above symptoms seem most consistent with neuromuscular junction disorder given unrevealing head imaging, aggravating and alleviating factors, and improvement with cold. MRI brain, personally reviewed by me, with small vessel ischemic changes and left frontal DVA but no other acute intracranial findings  - Labs as per resident note  - CT chest w/ contrast to assess for thymoma  - Would hold off on myasthenic/neuromuscular junction disorder medications pending outpatient follow-up and above work-up  - No neurologic contraindications to discharge if patient is otherwise medically stable. Patient can follow up with neuromuscular neurology (Dr. Felton Stewart or Dr. Dashawn Byers) at [address] [place] [address] 1-2 weeks after discharge. Please instruct the patient to call [phone] to schedule this appointment. Alternatively, can follow-up with neurology resident clinic ([phone]) if insurance doesn't allow above  - Continue to address above medical problems, as you are doing  - Will continue to follow patient peripherally with you, please call [phone]) with additional questions or concerns

## 2024-09-25 NOTE — DISCHARGE NOTE PROVIDER - NSDCFUSCHEDAPPT_GEN_ALL_CORE_FT
Vero Davis  [facility]  [facility] [address] [facility]  Scheduled Appointment: 11/07/2024

## 2024-09-25 NOTE — H&P ADULT - HISTORY OF PRESENT ILLNESS
59M c hx HTN not on any medications, GERD, prior ETOH abuse (last drink 11 yrs ago), pw chest pain, SOB, syncope, right eyelid ptosis.    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 At rest, pt reports a soreness on his left chest. The pain is worsened with exertion, associated with SOB, and improves with rest. During these episodes, pt also reports intermittent palpitations, feeling like his heart is beating heavily, but then sometimes feels his heart "stopping" for a moment, with associated lightheadedness. Pt reports passing out one time 6 days ago. Reports having a stress test and echocardiogram many years ago.    Pt also reports a "few months" of right eyelid drooping to the point he can't see. Pt states his symptom is present both on waking and at the end of the day. Denies any other neurologic symptoms including numbness, tingling, weakness, vision changes, slurred speech.

## 2024-09-25 NOTE — DISCHARGE NOTE PROVIDER - NSDCMRMEDTOKEN_GEN_ALL_CORE_FT
aspirin 81 mg oral tablet: 1 tab(s) orally once a day  labetalol 100 mg oral tablet: 1 tab(s) orally every 8 hours As needed SBP &gt;160  OTC Supplements: Melatonin, Vitamin D (Doses unknown)  Tylenol 500 mg oral tablet: 2 tab(s) orally once a day as needed for  mild pain

## 2024-09-25 NOTE — H&P ADULT - NSHPPHYSICALEXAM_GEN_ALL_CORE
PHYSICAL EXAM:   GENERAL: Alert. Not confused. No acute distress. Not thin. Not cachectic. Not obese.  HEAD:  Atraumatic. Normocephalic.  EYES: EOMI. PERRLA. Normal conjunctiva/sclera.  ENT: Neck supple. No JVD. Moist oral mucosa. Not edentulous. No thrush. +right eye ptosis  LYMPH: Normal supraclavicular/cervical lymph nodes.   CARDIAC: Not tachy, Not shukri. Regular rhythm. Not irregularly irregular. S1. S2. No murmur. No rub. No distant heart sounds.  LUNG/CHEST: CTAB. BS equal bilaterally. No wheezes. No rales. No rhonchi.  ABDOMEN: Soft. No tenderness. No distension. No fluid wave. Normal bowel sounds.  BACK: No midline/vertebral tenderness. No flank tenderness.  VASCULAR: +2 b/l radial or ulnar pulses. Palpable DP pulses.  EXTREMITIES:  No clubbing. No cyanosis. No edema. Moving all 4.  NEUROLOGY: A&Ox3. Non-focal exam. Cranial nerves intact. Normal speech. Sensation intact.  PSYCH: Normal behavior. Normal affect.    Vital Signs Last 24 Hrs  T(C): 36.4 (25 Sep 2024 01:26), Max: 37.2 (24 Sep 2024 14:06)  T(F): 97.5 (25 Sep 2024 01:26), Max: 98.9 (24 Sep 2024 14:06)  HR: 59 (25 Sep 2024 01:26) (59 - 86)  BP: 132/67 (25 Sep 2024 01:26) (132/67 - 198/98)  BP(mean): --  ABP: --  ABP(mean): --  RR: 20 (25 Sep 2024 01:26) (18 - 20)  SpO2: 96% (25 Sep 2024 01:26) (96% - 100%)    O2 Parameters below as of 25 Sep 2024 01:26  Patient On (Oxygen Delivery Method): room air          I&O's Summary PHYSICAL EXAM:   GENERAL: Alert. Not confused. No acute distress. Not thin. Not cachectic. Not obese.  HEAD:  Atraumatic. Normocephalic.  EYES: Normal conjunctiva/sclera.  ENT: Neck supple. No JVD. Moist oral mucosa. Not edentulous. No thrush. +right eye ptosis  LYMPH: Normal supraclavicular/cervical lymph nodes.   CARDIAC: Not tachy, Not shukri. Regular rhythm. Not irregularly irregular. S1. S2. No murmur. No rub. No distant heart sounds.  LUNG/CHEST: CTAB. BS equal bilaterally. No wheezes. No rales. No rhonchi.  ABDOMEN: Soft. No tenderness. No distension. No fluid wave. Normal bowel sounds.  BACK: No midline/vertebral tenderness. No flank tenderness.  VASCULAR: +2 b/l radial or ulnar pulses. Palpable DP pulses.  EXTREMITIES:  No clubbing. No cyanosis. No edema. Moving all 4.  NEUROLOGY: A&Ox3. Non-focal exam. Cranial nerves intact. Normal speech. Sensation intact.  PSYCH: Normal behavior. Normal affect.    Vital Signs Last 24 Hrs  T(C): 36.4 (25 Sep 2024 01:26), Max: 37.2 (24 Sep 2024 14:06)  T(F): 97.5 (25 Sep 2024 01:26), Max: 98.9 (24 Sep 2024 14:06)  HR: 59 (25 Sep 2024 01:26) (59 - 86)  BP: 132/67 (25 Sep 2024 01:26) (132/67 - 198/98)  BP(mean): --  ABP: --  ABP(mean): --  RR: 20 (25 Sep 2024 01:26) (18 - 20)  SpO2: 96% (25 Sep 2024 01:26) (96% - 100%)    O2 Parameters below as of 25 Sep 2024 01:26  Patient On (Oxygen Delivery Method): room air          I&O's Summary

## 2024-09-25 NOTE — H&P ADULT - ASSESSMENT
59M c hx HTN not on any medications, GERD, prior ETOH abuse (last drink 11 yrs ago), pw chest pain, SOB, syncope, right eyelid ptosis.

## 2024-09-25 NOTE — H&P ADULT - NSHPSOCIALHISTORY_GEN_ALL_CORE
Social History:    Marital Status: ( x ) , (  ) Single, (  ) , (  ) , (  )   # of Children:   Lives with: (  ) alone, (  ) children, ( x ) spouse, (  ) parents, (  ) siblings, (  ) friends, (  ) other:   Occupation:     Substance Use/Illicit Drugs: (  ) never used vs other:   Tobacco Usage: ( x ) never smoked, (  ) former smoker, (  ) current smoker and Total Pack-Years:   Last Alcohol Usage/Frequency/Amount/Withdrawal/Hx of Abuse:  11 years ago

## 2024-09-25 NOTE — DISCHARGE NOTE PROVIDER - NSDCFUADDAPPT_GEN_ALL_CORE_FT
Follow up with cardiologist within 24 hrs of discharge to complete stress test outpatient.  Follow up with your neurologist within 24 hrs of discharge. Follow up with cardiologist within 24 hrs of discharge to complete stress test outpatient.  Follow up with your neurologist within 24 hrs of discharge.    APPTS ARE READY TO BE MADE: [x ] YES    Best Family or Patient Contact (if needed):    Additional Information about above appointments (if needed):    1: Dr. Felton Stewart   2:   3:     Other comments or requests:    Follow up with cardiologist within 24 hrs of discharge to complete stress test outpatient.  Follow up with your neurologist within 24 hrs of discharge.    APPTS ARE READY TO BE MADE: [x ] YES    Best Family or Patient Contact (if needed):    Additional Information about above appointments (if needed):    1: Dr. Felton Stewart   2:   3:     Other comments or requests:   Provided patient with provider referral information, however patient prefers to schedule the appointments on their own.

## 2024-09-25 NOTE — H&P ADULT - NSHPLABSRESULTS_GEN_ALL_CORE
Personally reviewed old records.  Personally reviewed labs.  Personally reviewed imaging.  Personally reviewed EKG. NSR rate 78 qtc 430. Q in V1-V2. no st or tw changes                          14.4   12.12 )-----------( 262      ( 24 Sep 2024 18:41 )             42.9       09-24    139  |  101  |  14  ----------------------------<  91  4.1   |  24  |  0.67    Ca    9.8      24 Sep 2024 18:41  Mg     2.2     09-24    TPro  7.6  /  Alb  4.8  /  TBili  1.5[H]  /  DBili  x   /  AST  18  /  ALT  17  /  AlkPhos  93  09-24            LIVER FUNCTIONS - ( 24 Sep 2024 18:41 )  Alb: 4.8 g/dL / Pro: 7.6 g/dL / ALK PHOS: 93 U/L / ALT: 17 U/L / AST: 18 U/L / GGT: x             PT/INR - ( 24 Sep 2024 18:41 )   PT: 12.3 sec;   INR: 1.08 ratio         PTT - ( 24 Sep 2024 18:41 )  PTT:32.7 sec    Urinalysis Basic - ( 24 Sep 2024 18:41 )    Color: x / Appearance: x / SG: x / pH: x  Gluc: 91 mg/dL / Ketone: x  / Bili: x / Urobili: x   Blood: x / Protein: x / Nitrite: x   Leuk Esterase: x / RBC: x / WBC x   Sq Epi: x / Non Sq Epi: x / Bacteria: x

## 2024-09-25 NOTE — DISCHARGE NOTE PROVIDER - CARE PROVIDER_API CALL
Mercy Underwood  [facility] Medicine  [address] [facility], [address] [address]  Phone: [phone]  Fax: [phone]  Follow Up Time: 1-3 days   Mercy Underwood  [facility] Medicine  [address], [address]  [place] [address]  Phone: [phone]  Fax: [phone]  Follow Up Time: 1-3 days    Felton Stewart  Neurology  [address] [place] [address] [address]  [place] [address]  Phone: [phone]  Fax: [phone]  Follow Up Time: 2 weeks

## 2024-09-25 NOTE — CONSULT NOTE ADULT - ASSESSMENT
THIS NOTE IS INCOMPLETE    ASSESSMENT:  Patient JACKELIN CHAUHAN is a 59y (1965) man with a PMHx significant for HTN not on any medications admitted to hospital for evaluation and management of angina and SOB for which neurology was consulted to evaluate R eye ptosis    IMPRESSION:      Recs:    Diag:     Neurological:   - Neurological checks q4h   - Low threshold for intubation, Oxygen saturation is not a good monitor since MG can maintain good sats. Will follow PFT (FVC/NIF/MEF) q6h.   - Meds:     -Will continue on home dose of Mestinone 120mg q6h   -Will continue on home dose of Prednisone 30mg daily   ___________________   Respiratory:   - FVC/NIF/MEF Q4h   - Aspiration precautions, head of bed above 30 degrees   - PRN O2   - Chest PT with vest q4hr   - Baseline CXR   - Suctioning q1-2 hours   - Meds:   - None   ASSESSMENT:  Patient JACKELIN CHAUHAN is a 59y (1965) man with a PMHx significant for HTN not on any medications admitted to hospital for evaluation and management of angina and SOB for which neurology was consulted to evaluate R eye ptosis    IMPRESSION:  R unilateral ptosis in setting of chest pain and SOB and R side weakness. Presentation concerning for neuromuscular junction pathology (specifically MG) vs autoimmune pathology. Other differential causes of unilateral ptosis include diabetic neuropathy, other peripheral neuropathy, or intracranial vascular pathology. Additionally, patient endorsed chronic R side sensation decrease compared to L which localizes to R brain dysfunction possibly w ischemia as etiology and mechanism possibly related to long standing HTN vs small vessel disease    Recs:    Diag:  -CT chest w contrast to ro thymoma   -MRI brain wwo contrast  -A1c, lipid panel  -myesthesia gravis antibodies: ACHR (binding, blocking, modulating), MuSK, LRP4  -investigations for other causes of peripheral neuropathy: B1, B6, B12, MMA, Homocysteine, Folate, TSH, Heavy Metal Tox Screen, RPR, HIV, West Nile, Lyme    Meds:  [] discuss initiation of MG medications w neurology attending on rounds    Misc and manage:  -monitor NIF and VC q6h    *case and recs not final until attending attestation *

## 2024-09-25 NOTE — DISCHARGE NOTE PROVIDER - NSDCCPCAREPLAN_GEN_ALL_CORE_FT
PRINCIPAL DISCHARGE DIAGNOSIS  Diagnosis: Chest pain  Assessment and Plan of Treatment: Follow up cardiologist for outpatient stress test      SECONDARY DISCHARGE DIAGNOSES  Diagnosis: HTN (hypertension)  Assessment and Plan of Treatment: Continue home meds    Diagnosis: Syncope  Assessment and Plan of Treatment:     Diagnosis: Unstable angina pectoris  Assessment and Plan of Treatment:

## 2024-09-25 NOTE — DISCHARGE NOTE PROVIDER - ATTENDING ATTESTATION STATEMENT
Patient would like to go back on [facility]- he felt that worked better than [address] [place] [address]. He has a follow up visit on 1-13-21. I have personally seen and examined the patient. I have collaborated with and supervised the

## 2024-10-02 LAB
ACRM BINDING ANTIBODY: 0 NMOL/L — SIGNIFICANT CHANGE UP
INTERPRETATION MG: SIGNIFICANT CHANGE UP
P/Q TYPE ANTIBODY: 0 NMOL/L — SIGNIFICANT CHANGE UP

## 2024-11-14 ENCOUNTER — APPOINTMENT (OUTPATIENT)
Dept: SURGICAL ONCOLOGY | Facility: CLINIC | Age: 59
End: 2024-11-14

## 2025-03-13 DIAGNOSIS — I77.810 THORACIC AORTIC ECTASIA: ICD-10-CM

## 2025-04-11 ENCOUNTER — APPOINTMENT (OUTPATIENT)
Dept: CT IMAGING | Facility: CLINIC | Age: 60
End: 2025-04-11
Payer: COMMERCIAL

## 2025-04-11 ENCOUNTER — APPOINTMENT (OUTPATIENT)
Dept: MRI IMAGING | Facility: CLINIC | Age: 60
End: 2025-04-11
Payer: COMMERCIAL

## 2025-04-11 ENCOUNTER — OUTPATIENT (OUTPATIENT)
Dept: OUTPATIENT SERVICES | Facility: HOSPITAL | Age: 60
LOS: 1 days | End: 2025-04-11
Payer: COMMERCIAL

## 2025-04-11 DIAGNOSIS — K76.9 LIVER DISEASE, UNSPECIFIED: ICD-10-CM

## 2025-04-11 PROCEDURE — 74183 MRI ABD W/O CNTR FLWD CNTR: CPT | Mod: 26

## 2025-04-11 PROCEDURE — 71275 CT ANGIOGRAPHY CHEST: CPT

## 2025-04-11 PROCEDURE — 71275 CT ANGIOGRAPHY CHEST: CPT | Mod: 26

## 2025-04-11 PROCEDURE — 74183 MRI ABD W/O CNTR FLWD CNTR: CPT

## 2025-04-11 PROCEDURE — A9585: CPT

## 2025-04-24 PROBLEM — I71.21 ANEURYSM, ASCENDING AORTA: Status: ACTIVE | Noted: 2025-04-24

## 2025-04-30 NOTE — ED ADULT NURSE NOTE - NSFALLRISKFACTORS_ED_ALL_ED
Patient is a 59 year old male  admitted to room 432 via w/c. Patient is alert and oriented X 4. See Epic for VS and assessment.  Patient is able to transfer A-1 using no aids. Patient was settled into their room, shown call light, tv, mealtimes etc. Oriented to unit. Will continue monitoring pain level and VS. Notifying MD with any concerns.  Follow MD orders for cares and medications.    Flu Vaccine:   - Declined flu vaccine at this time, education on risks and benefits provided      COVID Vaccine:   -  Declined COVID vaccine at this time, education on risks and benefits provided        Pneumococcal Vaccine:   - Declined pneumococcal vaccine at this time, education on risks and benefits provided      Ethnicity:  Race: None of the above  Dentures: No  Hearing Aid: No  Smoker:  No  Glasses: Yes pt forgot glasses on 6A  Falls 0-1 mo: 0 2-6 mo: 0  Prior device use:   [employer] Directive Referral to Social Work?Yes  Goal Outcome Evaluation:    Set up pt's LVAD computer, plugged in batteries; pt able to go from batteries to wall computer independently.   Wound vac applied to pt and set to 125 mmHg. Seal check completed. WOC to change M & Th.   IV pole and pump placed in room. Pt prefers wound vac on IV pole for easier walking to BR.   Applied new pt wrist band.   Shown call light and answered questions. Pt reported being sleepy from long day.   R PICC DL.    No indicators present

## 2025-05-01 ENCOUNTER — APPOINTMENT (OUTPATIENT)
Dept: CARDIOTHORACIC SURGERY | Facility: CLINIC | Age: 60
End: 2025-05-01
Payer: COMMERCIAL

## 2025-05-01 VITALS
OXYGEN SATURATION: 99 % | HEIGHT: 68 IN | DIASTOLIC BLOOD PRESSURE: 75 MMHG | WEIGHT: 150 LBS | BODY MASS INDEX: 22.73 KG/M2 | RESPIRATION RATE: 16 BRPM | TEMPERATURE: 98.5 F | SYSTOLIC BLOOD PRESSURE: 167 MMHG | HEART RATE: 63 BPM

## 2025-05-01 DIAGNOSIS — I71.21 ANEURYSM OF THE ASCENDING AORTA, WITHOUT RUPTURE: ICD-10-CM

## 2025-05-01 DIAGNOSIS — E78.5 HYPERLIPIDEMIA, UNSPECIFIED: ICD-10-CM

## 2025-05-01 DIAGNOSIS — I10 ESSENTIAL (PRIMARY) HYPERTENSION: ICD-10-CM

## 2025-05-01 PROCEDURE — 99205 OFFICE O/P NEW HI 60 MIN: CPT

## 2025-05-01 RX ORDER — ASPIRIN 81 MG/1
81 TABLET, DELAYED RELEASE ORAL DAILY
Qty: 30 | Refills: 0 | Status: ACTIVE | COMMUNITY
Start: 2025-05-01